# Patient Record
Sex: FEMALE | Race: WHITE | NOT HISPANIC OR LATINO | Employment: FULL TIME | URBAN - NONMETROPOLITAN AREA
[De-identification: names, ages, dates, MRNs, and addresses within clinical notes are randomized per-mention and may not be internally consistent; named-entity substitution may affect disease eponyms.]

---

## 2017-05-22 ENCOUNTER — TELEPHONE (OUTPATIENT)
Dept: OBSTETRICS AND GYNECOLOGY | Facility: CLINIC | Age: 47
End: 2017-05-22

## 2017-05-22 RX ORDER — NORGESTIMATE AND ETHINYL ESTRADIOL 0.25-0.035
1 KIT ORAL DAILY
Qty: 28 TABLET | Refills: 2 | Status: SHIPPED | OUTPATIENT
Start: 2017-05-22 | End: 2017-06-30 | Stop reason: SDUPTHER

## 2017-06-30 ENCOUNTER — OFFICE VISIT (OUTPATIENT)
Dept: OBSTETRICS AND GYNECOLOGY | Facility: CLINIC | Age: 47
End: 2017-06-30

## 2017-06-30 VITALS
BODY MASS INDEX: 18.95 KG/M2 | HEIGHT: 64 IN | DIASTOLIC BLOOD PRESSURE: 56 MMHG | SYSTOLIC BLOOD PRESSURE: 102 MMHG | WEIGHT: 111 LBS

## 2017-06-30 DIAGNOSIS — Z01.419 ENCOUNTER FOR GYNECOLOGICAL EXAMINATION WITHOUT ABNORMAL FINDING: Primary | ICD-10-CM

## 2017-06-30 PROCEDURE — 99396 PREV VISIT EST AGE 40-64: CPT | Performed by: OBSTETRICS & GYNECOLOGY

## 2017-06-30 RX ORDER — NORGESTIMATE AND ETHINYL ESTRADIOL 0.25-0.035
1 KIT ORAL DAILY
Qty: 28 TABLET | Refills: 12 | Status: SHIPPED | OUTPATIENT
Start: 2017-06-30 | End: 2017-07-03

## 2017-06-30 NOTE — PROGRESS NOTES
Subjective   Chief Complaint   Patient presents with   • Gynecologic Exam     Ras Echevarria is a 46 y.o. year old  (C/S x 2)  presenting to be seen for her annual exam.   Msnes regular for [employer]  Needs MMG  LABS WNL 1 year ago  SEXUAL Hx:  She is currently sexually active.  In the past year there has not been new sexual partners.    Condoms are not typically used.  She would not like to be screened for STD's at today's exam.  Current birth control method: OCP (estrogen/progesterone).  MENSTRUAL Hx:  Patient's last menstrual period was 2017.  In the past 6 months her cycles have been regular, predictable and occur monthly.   Her menstrual flow is normal.   Each month on average there are roughly 5 days of very heavy flow.    Intermenstrual bleeding is absent.    Post-coital bleeding is absent.  Dysmenorrhea: is not affecting her activities of daily living  PMS: is not affecting her activities of daily living  Her cycles are not a source of concern for her that she wishes to discuss today.  HEALTH Hx:  She exercises regularly: no (and has no plans to become more active).  She wears her seat belt:yes.  She has concerns about domestic violence: no.  OTHER COMPLAINTS:  Nothing else    The following portions of the patient's history were reviewed and updated as appropriate:problem list, current medications, allergies, past family history, past medical history, past social history and past surgical history.    Smoking status: Never Smoker                                                              Smokeless status: Never Used                        Review of Systems  Consitutional NEG: anorexia or night sweats    POS: nothing reported   Gastointestinal NEG: bloating, change in bowel habits, melena or reflux symptoms    POS: nothing reported   Genitourinary NEG: dysuria or hematuria    POS: nothing reported   Integument NEG: moles that are changing in size, shape, color or rashes    POS: nothing  "reported   Breast NEG: persistent breast lump, skin dimpling or nipple discharge    POS: nothing reported          Objective   /56  Ht 64\" (162.6 cm)  Wt 111 lb (50.3 kg)  LMP 05/02/2017  Breastfeeding? No  BMI 19.05 kg/m2    General:  well developed; well nourished  no acute distress   Skin:  No suspicious lesions seen   Thyroid: normal to inspection and palpation   Breasts:  Examined in supine position  Symmetric without masses or skin dimpling  Nipples normal without inversion, lesions or discharge  There are no palpable axillary nodes   Abdomen: soft, non-tender; no masses  no umbilical or inginual hernias are present  no hepato-splenomegaly   Pelvis: Clinical staff was present for exam  External genitalia:  normal appearance of the external genitalia including Bartholin's and [place]'s glands.  :  urethral meatus normal; urethral hypermobility is absent.  Vaginal:  normal pink mucosa without prolapse or lesions.  Cervix:  normal appearance.  Uterus:  normal size, shape and consistency.  Adnexa:  normal bimanual exam of the adnexa.        Assessment   1. Normal PE  2. Amernoereha since coming off of OCP 5/17     Plan   1. PAP done  2. Meds were prescribed - yes  3. Follow up prn, labs today    No orders of the defined types were placed in this encounter.         This note was electronically signed.      June 30, 2017    "

## 2017-07-01 LAB
ESTRADIOL SERPL-MCNC: 111.5 PG/ML
FSH SERPL-ACNC: 70.8 MIU/ML
HBA1C MFR BLD: 5.3 %
HCG INTACT+B SERPL-ACNC: <2.39 MIU/ML
PROLACTIN SERPL-MCNC: 35.4 NG/ML (ref 4.8–23.3)
TSH SERPL DL<=0.005 MIU/L-ACNC: 1.79 MIU/ML (ref 0.47–4.68)

## 2017-07-03 RX ORDER — NORGESTIMATE AND ETHINYL ESTRADIOL 0.25-0.035
1 KIT ORAL DAILY
Qty: 84 TABLET | Refills: 4 | Status: SHIPPED | OUTPATIENT
Start: 2017-07-03 | End: 2018-07-25 | Stop reason: SDUPTHER

## 2017-07-12 DIAGNOSIS — Z01.419 ENCOUNTER FOR GYNECOLOGICAL EXAMINATION WITHOUT ABNORMAL FINDING: ICD-10-CM

## 2017-07-26 ENCOUNTER — TELEPHONE (OUTPATIENT)
Dept: OBSTETRICS AND GYNECOLOGY | Facility: CLINIC | Age: 47
End: 2017-07-26

## 2017-07-26 DIAGNOSIS — Z12.31 SCREENING MAMMOGRAM, ENCOUNTER FOR: Primary | ICD-10-CM

## 2017-07-26 NOTE — TELEPHONE ENCOUNTER
----- Message from Radhika Galvan sent at 7/26/2017  8:47 AM EDT -----  Contact: PT  THIS IS DR BOJORQUEZ'S PT.  SHE CALLED WANTING TO CHANGE HER MAMMOGRAM ORDER TO [facility].  IT WAS PUT IN AS EXTERNAL.  THANKS

## 2017-08-11 ENCOUNTER — HOSPITAL ENCOUNTER (OUTPATIENT)
Dept: MAMMOGRAPHY | Facility: HOSPITAL | Age: 47
Discharge: HOME | End: 2017-08-11
Attending: OBSTETRICS & GYNECOLOGY | Admitting: OBSTETRICS & GYNECOLOGY

## 2017-08-11 ENCOUNTER — TELEPHONE (OUTPATIENT)
Dept: OBSTETRICS AND GYNECOLOGY | Facility: CLINIC | Age: 47
End: 2017-08-11

## 2017-08-11 DIAGNOSIS — Z12.31 SCREENING MAMMOGRAM, ENCOUNTER FOR: ICD-10-CM

## 2017-08-11 DIAGNOSIS — Z00.00 ROUTINE CHECK-UP: Primary | ICD-10-CM

## 2017-08-11 PROCEDURE — 77063 BREAST TOMOSYNTHESIS BI: CPT

## 2017-08-11 PROCEDURE — G0202 SCR MAMMO BI INCL CAD: HCPCS

## 2017-08-11 NOTE — TELEPHONE ENCOUNTER
----- Message from Rylee Sung sent at 8/11/2017  2:04 PM EDT -----  Contact: PT  PT REQUESTING HER SEROTONIN LEVELS TO BE DRAWN AT [facility]  [phone]

## 2017-08-12 LAB
ESTRADIOL SERPL-MCNC: 73 PG/ML
FSH SERPL-ACNC: 4.3 MIU/ML
PROLACTIN SERPL-MCNC: 24.4 NG/ML (ref 4.8–23.3)

## 2017-08-14 DIAGNOSIS — R92.8 ABNORMAL MAMMOGRAM: Primary | ICD-10-CM

## 2017-09-01 ENCOUNTER — HOSPITAL ENCOUNTER (OUTPATIENT)
Dept: MAMMOGRAPHY | Facility: HOSPITAL | Age: 47
Discharge: HOME | End: 2017-09-01
Attending: OBSTETRICS & GYNECOLOGY | Admitting: OBSTETRICS & GYNECOLOGY

## 2017-09-01 DIAGNOSIS — R92.8 ABNORMAL MAMMOGRAM: ICD-10-CM

## 2017-09-01 PROCEDURE — G0206 DX MAMMO INCL CAD UNI: HCPCS

## 2017-09-01 PROCEDURE — G0279 TOMOSYNTHESIS, MAMMO: HCPCS

## 2018-01-26 ENCOUNTER — TRANSCRIBE ORDERS (OUTPATIENT)
Dept: MAMMOGRAPHY | Facility: HOSPITAL | Age: 48
End: 2018-01-26

## 2018-01-26 DIAGNOSIS — R92.8 ABNORMAL MAMMOGRAM: Primary | ICD-10-CM

## 2018-03-01 ENCOUNTER — HOSPITAL ENCOUNTER (OUTPATIENT)
Dept: MAMMOGRAPHY | Facility: HOSPITAL | Age: 48
Discharge: HOME | End: 2018-03-01
Attending: OBSTETRICS & GYNECOLOGY | Admitting: OBSTETRICS & GYNECOLOGY

## 2018-03-01 DIAGNOSIS — R92.8 ABNORMAL MAMMOGRAM: ICD-10-CM

## 2018-03-01 PROCEDURE — G0279 TOMOSYNTHESIS, MAMMO: HCPCS

## 2018-03-01 PROCEDURE — 77065 DX MAMMO INCL CAD UNI: CPT

## 2018-04-17 ENCOUNTER — TRANSCRIBE ORDERS (OUTPATIENT)
Dept: MAMMOGRAPHY | Facility: HOSPITAL | Age: 48
End: 2018-04-17

## 2018-04-17 DIAGNOSIS — Z12.39 BREAST CANCER SCREENING: Primary | ICD-10-CM

## 2018-08-13 ENCOUNTER — HOSPITAL ENCOUNTER (OUTPATIENT)
Dept: MAMMOGRAPHY | Facility: HOSPITAL | Age: 48
Discharge: HOME | End: 2018-08-13
Attending: OBSTETRICS & GYNECOLOGY | Admitting: OBSTETRICS & GYNECOLOGY

## 2018-08-13 DIAGNOSIS — Z12.39 BREAST CANCER SCREENING: ICD-10-CM

## 2018-08-13 PROCEDURE — 77067 SCR MAMMO BI INCL CAD: CPT

## 2018-08-13 PROCEDURE — 77063 BREAST TOMOSYNTHESIS BI: CPT

## 2018-08-13 PROCEDURE — 77066 DX MAMMO INCL CAD BI: CPT

## 2018-08-13 PROCEDURE — G0279 TOMOSYNTHESIS, MAMMO: HCPCS

## 2018-09-14 ENCOUNTER — OFFICE VISIT (OUTPATIENT)
Dept: OBSTETRICS AND GYNECOLOGY | Facility: CLINIC | Age: 48
End: 2018-09-14

## 2018-09-14 VITALS
WEIGHT: 111 LBS | DIASTOLIC BLOOD PRESSURE: 62 MMHG | SYSTOLIC BLOOD PRESSURE: 110 MMHG | HEIGHT: 64 IN | BODY MASS INDEX: 18.95 KG/M2

## 2018-09-14 DIAGNOSIS — Z01.419 ENCOUNTER FOR GYNECOLOGICAL EXAMINATION WITHOUT ABNORMAL FINDING: Primary | ICD-10-CM

## 2018-09-14 PROCEDURE — 99396 PREV VISIT EST AGE 40-64: CPT | Performed by: OBSTETRICS & GYNECOLOGY

## 2018-09-14 NOTE — PROGRESS NOTES
Subjective   Chief Complaint   Patient presents with   • Gynecologic Exam     Last pap 2017 WNL, Hx of abnormal paps, (LEEP)     Ras Echevarria is a 48 y.o. year old  presenting to be seen for her annual exam.     SEXUAL Hx:  She is currently sexually active.  In the past year there has not been new sexual partners.    Condoms are not typically used.  She would not like to be screened for STD's at today's exam.  Current birth control method: OCP (estrogen/progesterone).  MENSTRUAL Hx:  Patient's last menstrual period was 2017.  In the past 6 months her cycles have been regular, predictable and occur monthly.   Her menstrual flow is normal.   Each month on average there are roughly 0 days of very heavy flow.    Intermenstrual bleeding is absent.    Post-coital bleeding is absent.  Dysmenorrhea: is not affecting her activities of daily living  PMS: is not affecting her activities of daily living  Her cycles are not a source of concern for her that she wishes to discuss today.  HEALTH Hx:  She exercises regularly: no (and has no plans to become more active).  She wears her seat belt:yes.  She has concerns about domestic violence: no.  OTHER COMPLAINTS:  Nothing else    The following portions of the patient's history were reviewed and updated as appropriate:  She  has a past medical history of Abnormal Pap smear of cervix; Depression; H/O colposcopy with cervical biopsy (2014); History of colposcopy (2015); HPV in female (2013); HPV in female (2014); HPV in female (2015); HPV in female (2015); and Moderate dysplasia of cervix (ADIN II) ().  She  does not have any pertinent problems on file.  She  has a past surgical history that includes LEEP (2014);  section (10/1996); and  section (2003).  Her family history includes Diabetes in her mother; Hypertension in her brother and mother; Thyroid disease in her mother.  She  reports that she  "has never smoked. She has never used smokeless tobacco. She reports that she does not drink alcohol or use drugs.  Current Outpatient Prescriptions   Medication Sig Dispense Refill   • SPRINTEC 28 0.25-35 MG-MCG per tablet TAKE 1 TABLET BY MOUTH DAILY. 84 tablet 0     No current facility-administered medications for this visit.      Current Outpatient Prescriptions on File Prior to Visit   Medication Sig   • SPRINTEC 28 0.25-35 MG-MCG per tablet TAKE 1 TABLET BY MOUTH DAILY.     No current facility-administered medications on file prior to visit.      She has No Known Allergies..    Smoking status: Never Smoker                                                              Smokeless tobacco: Never Used                        Review of Systems  Consitutional NEG: anorexia or night sweats    POS: nothing reported   Gastointestinal NEG: bloating, change in bowel habits, melena or reflux symptoms    POS: nothing reported   Genitourinary NEG: dysuria or hematuria    POS: nothing reported   Integument NEG: moles that are changing in size, shape, color or rashes    POS: nothing reported   Breast NEG: persistent breast lump, skin dimpling or nipple discharge    POS: nothing reported          Objective   /62   Ht 162.6 cm (64\")   Wt 50.3 kg (111 lb)   LMP 06/01/2017   BMI 19.05 kg/m²     General:  well developed; well nourished  no acute distress   Skin:  No suspicious lesions seen   Thyroid: normal to inspection and palpation   Breasts:  Examined in supine position  Symmetric without masses or skin dimpling  Nipples normal without inversion, lesions or discharge  There are no palpable axillary nodes   Abdomen: soft, non-tender; no masses  no umbilical or inginual hernias are present  no hepato-splenomegaly   Pelvis: Clinical staff was present for exam  External genitalia:  normal appearance of the external genitalia including Bartholin's and [place]'s glands.  :  urethral meatus normal;  Vaginal:  normal pink mucosa " without prolapse or lesions.  Cervix:  normal appearance.  Uterus:  normal size, shape and consistency.  Adnexa:  normal bimanual exam of the adnexa.  Rectal:  digital rectal exam not performed; anus visually normal appearing.        Assessment   1. NOrmal PE     Plan   1. PAP done  2. Sprintec refilled  3. MMG reviewed  4. Fasting labs  5. Follow up     No orders of the defined types were placed in this encounter.         This note was electronically signed.      September 14, 2018

## 2018-09-19 ENCOUNTER — RESULTS ENCOUNTER (OUTPATIENT)
Dept: OBSTETRICS AND GYNECOLOGY | Facility: CLINIC | Age: 48
End: 2018-09-19

## 2018-09-19 DIAGNOSIS — Z01.419 ENCOUNTER FOR GYNECOLOGICAL EXAMINATION WITHOUT ABNORMAL FINDING: ICD-10-CM

## 2018-09-21 DIAGNOSIS — Z01.419 ENCOUNTER FOR GYNECOLOGICAL EXAMINATION WITHOUT ABNORMAL FINDING: ICD-10-CM

## 2018-09-21 LAB
ALBUMIN SERPL-MCNC: 4.1 G/DL (ref 3.5–5)
ALBUMIN/GLOB SERPL: 1.6 G/DL (ref 1–2)
ALP SERPL-CCNC: 46 U/L (ref 38–126)
ALT SERPL-CCNC: 30 U/L (ref 13–69)
AST SERPL-CCNC: 25 U/L (ref 15–46)
BASOPHILS # BLD AUTO: 0.06 10*3/MM3 (ref 0–0.2)
BASOPHILS NFR BLD AUTO: 1.1 % (ref 0–2.5)
BILIRUB SERPL-MCNC: 0.4 MG/DL (ref 0.2–1.3)
BUN SERPL-MCNC: 19 MG/DL (ref 7–20)
BUN/CREAT SERPL: 21.1 (ref 7.1–23.5)
CALCIUM SERPL-MCNC: 9.1 MG/DL (ref 8.4–10.2)
CHLORIDE SERPL-SCNC: 106 MMOL/L (ref 98–107)
CHOLEST SERPL-MCNC: 137 MG/DL (ref 0–199)
CO2 SERPL-SCNC: 25 MMOL/L (ref 26–30)
CREAT SERPL-MCNC: 0.9 MG/DL (ref 0.6–1.3)
EOSINOPHIL # BLD AUTO: 0.15 10*3/MM3 (ref 0–0.7)
EOSINOPHIL NFR BLD AUTO: 2.7 % (ref 0–7)
ERYTHROCYTE [DISTWIDTH] IN BLOOD BY AUTOMATED COUNT: 12.8 % (ref 11.5–14.5)
GLOBULIN SER CALC-MCNC: 2.6 GM/DL
GLUCOSE SERPL-MCNC: 87 MG/DL (ref 74–98)
HBA1C MFR BLD: 5.3 %
HCT VFR BLD AUTO: 37.8 % (ref 37–47)
HDLC SERPL-MCNC: 73 MG/DL (ref 40–60)
HGB BLD-MCNC: 12.7 G/DL (ref 12–16)
IMM GRANULOCYTES # BLD: 0.02 10*3/MM3 (ref 0–0.06)
IMM GRANULOCYTES NFR BLD: 0.4 % (ref 0–0.6)
LDLC SERPL CALC-MCNC: 53 MG/DL (ref 0–99)
LYMPHOCYTES # BLD AUTO: 1.44 10*3/MM3 (ref 0.6–3.4)
LYMPHOCYTES NFR BLD AUTO: 26.1 % (ref 10–50)
MCH RBC QN AUTO: 32.2 PG (ref 27–31)
MCHC RBC AUTO-ENTMCNC: 33.6 G/DL (ref 30–37)
MCV RBC AUTO: 95.9 FL (ref 81–99)
MONOCYTES # BLD AUTO: 0.52 10*3/MM3 (ref 0–0.9)
MONOCYTES NFR BLD AUTO: 9.4 % (ref 0–12)
NEUTROPHILS # BLD AUTO: 3.32 10*3/MM3 (ref 2–6.9)
NEUTROPHILS NFR BLD AUTO: 60.3 % (ref 37–80)
NRBC BLD AUTO-RTO: 0 /100 WBC (ref 0–0)
PLATELET # BLD AUTO: 293 10*3/MM3 (ref 130–400)
POTASSIUM SERPL-SCNC: 4.3 MMOL/L (ref 3.5–5.1)
PROT SERPL-MCNC: 6.7 G/DL (ref 6.3–8.2)
RBC # BLD AUTO: 3.94 10*6/MM3 (ref 4.2–5.4)
SODIUM SERPL-SCNC: 139 MMOL/L (ref 137–145)
TRIGL SERPL-MCNC: 56 MG/DL
TSH SERPL DL<=0.005 MIU/L-ACNC: 1.62 MIU/ML (ref 0.47–4.68)
VLDLC SERPL CALC-MCNC: 11.2 MG/DL
WBC # BLD AUTO: 5.51 10*3/MM3 (ref 4.8–10.8)

## 2018-09-26 ENCOUNTER — TELEPHONE (OUTPATIENT)
Dept: OBSTETRICS AND GYNECOLOGY | Facility: CLINIC | Age: 48
End: 2018-09-26

## 2018-09-26 RX ORDER — METRONIDAZOLE 500 MG/1
500 TABLET ORAL 2 TIMES DAILY
Qty: 14 TABLET | Refills: 0 | Status: SHIPPED | OUTPATIENT
Start: 2018-09-26 | End: 2018-10-03

## 2018-09-26 NOTE — TELEPHONE ENCOUNTER
----- Message from Mikael Arango MD sent at 9/23/2018 10:05 AM EDT -----  Flagyl 500mg po BID x 7 days

## 2018-10-19 ENCOUNTER — TELEPHONE (OUTPATIENT)
Dept: OBSTETRICS AND GYNECOLOGY | Facility: CLINIC | Age: 48
End: 2018-10-19

## 2018-10-19 RX ORDER — NORGESTIMATE AND ETHINYL ESTRADIOL 0.25-0.035
1 KIT ORAL DAILY
Qty: 84 TABLET | Refills: 3 | Status: SHIPPED | OUTPATIENT
Start: 2018-10-19 | End: 2020-01-21 | Stop reason: SDUPTHER

## 2018-10-19 RX ORDER — NORGESTIMATE AND ETHINYL ESTRADIOL 0.25-0.035
1 KIT ORAL DAILY
Qty: 84 TABLET | Refills: 3 | Status: SHIPPED | OUTPATIENT
Start: 2018-10-19 | End: 2018-10-19 | Stop reason: SDUPTHER

## 2018-10-19 NOTE — TELEPHONE ENCOUNTER
----- Message from Radhika Galvan sent at 10/19/2018  3:15 PM EDT -----  Contact: PT  THIS IS DR BOJROQUEZ'S PT.  SHE WAS SEEN FOR ANNUAL ON 9/14/18, BUT DIDN'T GET REFILLS ON HER SPRINTEC.  PLEASE SEND TO [facility] IN [place].  THANKS

## 2018-10-19 NOTE — TELEPHONE ENCOUNTER
----- Message from Radhika Galvan sent at 10/19/2018  3:15 PM EDT -----  Contact: PT  THIS IS DR BOJORQUEZ'S PT.  SHE WAS SEEN FOR ANNUAL ON 9/14/18, BUT DIDN'T GET REFILLS ON HER SPRINTEC.  PLEASE SEND TO [facility] IN [place].  THANKS

## 2018-11-02 ENCOUNTER — APPOINTMENT (OUTPATIENT)
Dept: GENERAL RADIOLOGY | Facility: HOSPITAL | Age: 48
End: 2018-11-02

## 2018-11-02 ENCOUNTER — HOSPITAL ENCOUNTER (EMERGENCY)
Facility: HOSPITAL | Age: 48
Discharge: HOME | End: 2018-11-02
Attending: STUDENT IN AN ORGANIZED HEALTH CARE EDUCATION/TRAINING PROGRAM | Admitting: STUDENT IN AN ORGANIZED HEALTH CARE EDUCATION/TRAINING PROGRAM

## 2018-11-02 VITALS
RESPIRATION RATE: 18 BRPM | WEIGHT: 109 LBS | TEMPERATURE: 98.2 F | HEART RATE: 70 BPM | DIASTOLIC BLOOD PRESSURE: 78 MMHG | SYSTOLIC BLOOD PRESSURE: 106 MMHG | BODY MASS INDEX: 19.31 KG/M2 | HEIGHT: 63 IN | OXYGEN SATURATION: 98 %

## 2018-11-02 DIAGNOSIS — R07.9 CHEST PAIN, UNSPECIFIED TYPE: Primary | ICD-10-CM

## 2018-11-02 LAB
ALBUMIN SERPL-MCNC: 4 G/DL (ref 3.5–5)
ALBUMIN/GLOB SERPL: 1.3 G/DL (ref 1–2)
ALP SERPL-CCNC: 47 U/L (ref 38–126)
ALT SERPL W P-5'-P-CCNC: 36 U/L (ref 13–69)
ANION GAP SERPL CALCULATED.3IONS-SCNC: 10.7 MMOL/L (ref 10–20)
AST SERPL-CCNC: 34 U/L (ref 15–46)
BASOPHILS # BLD AUTO: 0.04 10*3/MM3 (ref 0–0.2)
BASOPHILS NFR BLD AUTO: 0.6 % (ref 0–2.5)
BILIRUB SERPL-MCNC: 0.4 MG/DL (ref 0.2–1.3)
BUN BLD-MCNC: 19 MG/DL (ref 7–20)
BUN/CREAT SERPL: 27.1 (ref 7.1–23.5)
CALCIUM SPEC-SCNC: 8.4 MG/DL (ref 8.4–10.2)
CHLORIDE SERPL-SCNC: 106 MMOL/L (ref 98–107)
CO2 SERPL-SCNC: 25 MMOL/L (ref 26–30)
CREAT BLD-MCNC: 0.7 MG/DL (ref 0.6–1.3)
DEPRECATED RDW RBC AUTO: 44.4 FL (ref 37–54)
EOSINOPHIL # BLD AUTO: 0.12 10*3/MM3 (ref 0–0.7)
EOSINOPHIL NFR BLD AUTO: 1.7 % (ref 0–7)
ERYTHROCYTE [DISTWIDTH] IN BLOOD BY AUTOMATED COUNT: 12.8 % (ref 11.5–14.5)
GFR SERPL CREATININE-BSD FRML MDRD: 90 ML/MIN/1.73
GLOBULIN UR ELPH-MCNC: 3.2 GM/DL
GLUCOSE BLD-MCNC: 98 MG/DL (ref 74–98)
HCT VFR BLD AUTO: 35.3 % (ref 37–47)
HGB BLD-MCNC: 11.8 G/DL (ref 12–16)
HOLD SPECIMEN: NORMAL
HOLD SPECIMEN: NORMAL
IMM GRANULOCYTES # BLD: 0.02 10*3/MM3 (ref 0–0.06)
IMM GRANULOCYTES NFR BLD: 0.3 % (ref 0–0.6)
LYMPHOCYTES # BLD AUTO: 1.94 10*3/MM3 (ref 0.6–3.4)
LYMPHOCYTES NFR BLD AUTO: 27.4 % (ref 10–50)
MCH RBC QN AUTO: 31.7 PG (ref 27–31)
MCHC RBC AUTO-ENTMCNC: 33.4 G/DL (ref 30–37)
MCV RBC AUTO: 94.9 FL (ref 81–99)
MONOCYTES # BLD AUTO: 0.79 10*3/MM3 (ref 0–0.9)
MONOCYTES NFR BLD AUTO: 11.1 % (ref 0–12)
NEUTROPHILS # BLD AUTO: 4.18 10*3/MM3 (ref 2–6.9)
NEUTROPHILS NFR BLD AUTO: 58.9 % (ref 37–80)
NRBC BLD MANUAL-RTO: 0 /100 WBC (ref 0–0)
PLATELET # BLD AUTO: 225 10*3/MM3 (ref 130–400)
PMV BLD AUTO: 10 FL (ref 6–12)
POTASSIUM BLD-SCNC: 4.7 MMOL/L (ref 3.5–5.1)
PROT SERPL-MCNC: 7.2 G/DL (ref 6.3–8.2)
RBC # BLD AUTO: 3.72 10*6/MM3 (ref 4.2–5.4)
RBC MORPH BLD: NORMAL
SMALL PLATELETS BLD QL SMEAR: ADEQUATE
SODIUM BLD-SCNC: 137 MMOL/L (ref 137–145)
TROPONIN I SERPL-MCNC: 0.02 NG/ML (ref 0–0.03)
TROPONIN I SERPL-MCNC: <0.012 NG/ML (ref 0–0.03)
WBC MORPH BLD: NORMAL
WBC NRBC COR # BLD: 7.09 10*3/MM3 (ref 4.8–10.8)
WHOLE BLOOD HOLD SPECIMEN: NORMAL
WHOLE BLOOD HOLD SPECIMEN: NORMAL

## 2018-11-02 PROCEDURE — 80053 COMPREHEN METABOLIC PANEL: CPT | Performed by: STUDENT IN AN ORGANIZED HEALTH CARE EDUCATION/TRAINING PROGRAM

## 2018-11-02 PROCEDURE — 84484 ASSAY OF TROPONIN QUANT: CPT | Performed by: STUDENT IN AN ORGANIZED HEALTH CARE EDUCATION/TRAINING PROGRAM

## 2018-11-02 PROCEDURE — 93005 ELECTROCARDIOGRAM TRACING: CPT | Performed by: STUDENT IN AN ORGANIZED HEALTH CARE EDUCATION/TRAINING PROGRAM

## 2018-11-02 PROCEDURE — 85025 COMPLETE CBC W/AUTO DIFF WBC: CPT | Performed by: STUDENT IN AN ORGANIZED HEALTH CARE EDUCATION/TRAINING PROGRAM

## 2018-11-02 PROCEDURE — 85007 BL SMEAR W/DIFF WBC COUNT: CPT | Performed by: STUDENT IN AN ORGANIZED HEALTH CARE EDUCATION/TRAINING PROGRAM

## 2018-11-02 PROCEDURE — 71046 X-RAY EXAM CHEST 2 VIEWS: CPT

## 2018-11-02 PROCEDURE — 99284 EMERGENCY DEPT VISIT MOD MDM: CPT

## 2018-11-02 NOTE — ED PROVIDER NOTES
Subjective   48-year-old female that presents after having an episode of chest pain while at work this morning.  Pain started approximately a quartertil 8 and lasted a little over one hour.  The patient states that she is concerned this may have been related to too much caffeine as she had a super strong coffee before going to work this morning.  Patient is otherwise healthy on medication she takes is birth control.  No prominent family history for premature coronary artery disease.  Patient does not smoke and does not use illicit drugs.            Review of Systems   All other systems reviewed and are negative.      Past Medical History:   Diagnosis Date   • Abnormal Pap smear of cervix    • Depression    • H/O colposcopy with cervical biopsy 2014    MILD TO MODERATE DYSPLASIA ( DR BYNUM)   • History of colposcopy 2015    NO BIOPSY TAKEN (DR BYNUM)   • HPV in female 2013    NON 16/18 POSITIVE ON PAP (PAP NORMAL)   • HPV in female 2014    POSITIVE NON 16/18 (PAP NORMAL)   • HPV in female 2015    POSITIVE NON 16/18 (PAP NORMAL)   • HPV in female 2015    POSITIVE NON 16/18 (PAP NORMAL)   • Moderate dysplasia of cervix (ADIN II)        No Known Allergies    Past Surgical History:   Procedure Laterality Date   •  SECTION  10/1996   •  SECTION  2003   • LEEP  2014    DR BYNUM       Family History   Problem Relation Age of Onset   • Thyroid disease Mother    • Hypertension Mother    • Diabetes Mother    • Hypertension Brother        Social History     Social History   • Marital status: Single     Social History Main Topics   • Smoking status: Never Smoker   • Smokeless tobacco: Never Used   • Alcohol use No   • Drug use: No   • Sexual activity: Not Currently     Other Topics Concern   • Not on file           Objective   Physical Exam   Nursing note and vitals reviewed.    GEN: No acute distress  Head: Normocephalic, atraumatic  Eyes: Pupils equal  round reactive to light  ENT: Posterior pharynx normal in appearance, oral mucosa is moist  Chest: Nontender to palpation  Cardiovascular: Regular rate  Lungs: Clear to auscultation bilaterally  Abdomen: Soft, nontender, nondistended, no peritoneal signs  Extremities: No edema, normal appearance  Neuro: GCS 15  Psych: Mood and affect are appropriate    Procedures           ED Course  ED Course as of Nov 02 1417 Fri Nov 02, 2018   1143 EKG shows normal sinus rhythm rate of 74.  There is RSR' pattern in V1 and V2 consistent with a right ventricular conduction delay.  QRS interval is 100.  No significant ST segments.  Borderline EKG.  Interpreted by me.  [DT]      ED Course User Index  [DT] Hans Goldman MD                  MDM  Number of Diagnoses or Management Options  Chest pain, unspecified type:   Diagnosis management comments: Differential diagnosis for this patient would include acute coronary syndrome, pulmonary embolus, thoracic aortic dissection, pericarditis, pneumonia, pneumothorax, Boerhaave syndrome, or other concerns.  Patient's chest x-ray shows no evidence of infiltrate or pneumothorax with a normal mediastinum.  I doubt thoracic aortic dissection as the pain was not maximal in onset, ripping or tearing, did not migrate, along with a normal mediastinum on chest x-ray.  At this time the pain would be atypical for pulmonary embolus as it does not have a pleuritic component and is intermittent.  Serial troponins will be drawn to assess for acute MI.       Amount and/or Complexity of Data Reviewed  Clinical lab tests: reviewed  Tests in the radiology section of CPT®: reviewed  Decide to obtain previous medical records or to obtain history from someone other than the patient: yes  Obtain history from someone other than the patient: yes  Independent visualization of images, tracings, or specimens: yes          Final diagnoses:   Chest pain, unspecified type            Hans Golmdan MD  11/02/18  3121

## 2020-01-21 ENCOUNTER — OFFICE VISIT (OUTPATIENT)
Dept: OBSTETRICS AND GYNECOLOGY | Facility: CLINIC | Age: 50
End: 2020-01-21

## 2020-01-21 VITALS
SYSTOLIC BLOOD PRESSURE: 108 MMHG | DIASTOLIC BLOOD PRESSURE: 60 MMHG | WEIGHT: 113 LBS | HEIGHT: 63 IN | BODY MASS INDEX: 20.02 KG/M2

## 2020-01-21 DIAGNOSIS — Z01.419 ENCOUNTER FOR GYNECOLOGICAL EXAMINATION WITHOUT ABNORMAL FINDING: Primary | ICD-10-CM

## 2020-01-21 PROCEDURE — 99396 PREV VISIT EST AGE 40-64: CPT | Performed by: OBSTETRICS & GYNECOLOGY

## 2020-01-21 RX ORDER — NORGESTIMATE AND ETHINYL ESTRADIOL 0.25-0.035
1 KIT ORAL DAILY
Qty: 84 TABLET | Refills: 3 | Status: SHIPPED | OUTPATIENT
Start: 2020-01-21

## 2020-01-21 NOTE — PROGRESS NOTES
Subjective   Chief Complaint   Patient presents with   • Gynecologic Exam     Last pap 2018 WNL, hx of abnormal paps, Last Mammogram 2018, Needs order for one    • Med Refill     needs yearly refill      Ras Echevarria is a 49 y.o. year old  presenting to be seen for her annual exam.     SEXUAL Hx:  She is currently sexually active.  In the past year there has not been new sexual partners.    Condoms are not typically used.  She would not like to be screened for STD's at today's exam.  Current birth control method: OCP (estrogen/progesterone).  MENSTRUAL Hx:  Patient's last menstrual period was 2020.  In the past 6 months her cycles have been regular, predictable and occur monthly.   Her menstrual flow is normal.   Each month on average there are roughly 0 days of very heavy flow.    Intermenstrual bleeding is absent.    Post-coital bleeding is absent.  Dysmenorrhea: is not affecting her activities of daily living  PMS: is not affecting her activities of daily living  Her cycles are not a source of concern for her that she wishes to discuss today.  HEALTH Hx:  She exercises regularly: no (and has no plans to become more active).  She wears her seat belt:yes.  She has concerns about domestic violence: no.  OTHER COMPLAINTS:  Nothing else    The following portions of the patient's history were reviewed and updated as appropriate:  She  has a past medical history of Abnormal Pap smear of cervix, Depression, H/O colposcopy with cervical biopsy (2014), History of colposcopy (2015), HPV in female (2013), HPV in female (2014), HPV in female (2015), HPV in female (2015), and Moderate dysplasia of cervix (ADIN II) ().  She does not have any pertinent problems on file.  She  has a past surgical history that includes LEEP (2014);  section (10/1996); and  section (2003).  Her family history includes Diabetes in her mother; Hypertension in her  "brother and mother; Thyroid disease in her mother.  She  reports that she has never smoked. She has never used smokeless tobacco. She reports that she does not drink alcohol or use drugs.  Current Outpatient Medications   Medication Sig Dispense Refill   • norgestimate-ethinyl estradiol (SPRINTEC 28) 0.25-35 MG-MCG per tablet Take 1 tablet by mouth Daily. 84 tablet 3     No current facility-administered medications for this visit.      Current Outpatient Medications on File Prior to Visit   Medication Sig   • norgestimate-ethinyl estradiol (SPRINTEC 28) 0.25-35 MG-MCG per tablet Take 1 tablet by mouth Daily.     No current facility-administered medications on file prior to visit.      She has No Known Allergies..    Social History    Tobacco Use      Smoking status: Never Smoker      Smokeless tobacco: Never Used    Review of Systems  Consitutional NEG: anorexia or night sweats    POS: nothing reported   Gastointestinal NEG: bloating, change in bowel habits, melena or reflux symptoms    POS: nothing reported   Genitourinary NEG: dysuria or hematuria    POS: nothing reported   Integument NEG: moles that are changing in size, shape, color or rashes    POS: nothing reported   Breast NEG: persistent breast lump, skin dimpling or nipple discharge    POS: nothing reported          Objective   /60   Ht 160 cm (63\")   Wt 51.3 kg (113 lb)   LMP 01/07/2020   BMI 20.02 kg/m²     General:  well developed; well nourished  no acute distress   Skin:  No suspicious lesions seen   Thyroid: normal to inspection and palpation   Breasts:  Examined in supine position  Symmetric without masses or skin dimpling  Nipples normal without inversion, lesions or discharge  There are no palpable axillary nodes   Abdomen: soft, non-tender; no masses  no umbilical or inguinal hernias are present  no hepato-splenomegaly   Pelvis: Clinical staff was present for exam  External genitalia:  normal appearance of the external genitalia " including Bartholin's and [place]'s glands.  :  urethral meatus normal;  Vaginal:  normal pink mucosa without prolapse or lesions.  Cervix:  normal appearance.  Uterus:  normal size, shape and consistency.  Adnexa:  normal bimanual exam of the adnexa.  Rectal:  digital rectal exam not performed; anus visually normal appearing.        Assessment   1. Normal PE     Plan   1. PAP done  2. MMG  3. OCP refilled  4. Follow up     No orders of the defined types were placed in this encounter.         This note was electronically signed.      January 21, 2020

## 2020-01-24 DIAGNOSIS — Z01.419 ENCOUNTER FOR GYNECOLOGICAL EXAMINATION WITHOUT ABNORMAL FINDING: ICD-10-CM

## 2020-03-06 ENCOUNTER — HOSPITAL ENCOUNTER (OUTPATIENT)
Dept: MAMMOGRAPHY | Facility: HOSPITAL | Age: 50
Discharge: HOME | End: 2020-03-06
Admitting: OBSTETRICS & GYNECOLOGY

## 2020-03-06 DIAGNOSIS — Z01.419 ENCOUNTER FOR GYNECOLOGICAL EXAMINATION WITHOUT ABNORMAL FINDING: ICD-10-CM

## 2020-03-06 PROCEDURE — 77063 BREAST TOMOSYNTHESIS BI: CPT

## 2020-03-06 PROCEDURE — 77067 SCR MAMMO BI INCL CAD: CPT

## 2021-02-23 ENCOUNTER — TRANSCRIBE ORDERS (OUTPATIENT)
Dept: ADMINISTRATIVE | Facility: HOSPITAL | Age: 51
End: 2021-02-23

## 2021-02-23 DIAGNOSIS — Z12.31 VISIT FOR SCREENING MAMMOGRAM: Primary | ICD-10-CM

## 2021-04-13 ENCOUNTER — HOSPITAL ENCOUNTER (OUTPATIENT)
Dept: MAMMOGRAPHY | Facility: HOSPITAL | Age: 51
Discharge: HOME | End: 2021-04-13
Admitting: RADIOLOGY

## 2021-04-13 DIAGNOSIS — Z12.31 VISIT FOR SCREENING MAMMOGRAM: ICD-10-CM

## 2021-04-13 PROCEDURE — 77067 SCR MAMMO BI INCL CAD: CPT

## 2021-04-13 PROCEDURE — 77063 BREAST TOMOSYNTHESIS BI: CPT

## 2022-02-18 ENCOUNTER — APPOINTMENT (OUTPATIENT)
Dept: GENERAL RADIOLOGY | Facility: HOSPITAL | Age: 52
End: 2022-02-18

## 2022-02-18 ENCOUNTER — HOSPITAL ENCOUNTER (EMERGENCY)
Facility: HOSPITAL | Age: 52
Discharge: HOME | End: 2022-02-18
Attending: FAMILY MEDICINE | Admitting: FAMILY MEDICINE

## 2022-02-18 VITALS
HEART RATE: 96 BPM | TEMPERATURE: 98 F | WEIGHT: 115 LBS | OXYGEN SATURATION: 97 % | BODY MASS INDEX: 20.38 KG/M2 | HEIGHT: 63 IN | SYSTOLIC BLOOD PRESSURE: 135 MMHG | RESPIRATION RATE: 18 BRPM | DIASTOLIC BLOOD PRESSURE: 86 MMHG

## 2022-02-18 DIAGNOSIS — S81.812A: Primary | ICD-10-CM

## 2022-02-18 PROCEDURE — 90715 TDAP VACCINE 7 YRS/> IM: CPT | Performed by: FAMILY MEDICINE

## 2022-02-18 PROCEDURE — 25010000002 TETANUS-DIPHTH-ACELL PERTUSSIS 5-2.5-18.5 LF-MCG/0.5 SUSPENSION PREFILLED SYRINGE: Performed by: FAMILY MEDICINE

## 2022-02-18 PROCEDURE — 99282 EMERGENCY DEPT VISIT SF MDM: CPT

## 2022-02-18 PROCEDURE — 90471 IMMUNIZATION ADMIN: CPT | Performed by: FAMILY MEDICINE

## 2022-02-18 PROCEDURE — 73590 X-RAY EXAM OF LOWER LEG: CPT

## 2022-02-18 PROCEDURE — 0 LIDOCAINE 1 % SOLUTION: Performed by: FAMILY MEDICINE

## 2022-02-18 RX ORDER — IBUPROFEN 800 MG/1
800 TABLET ORAL EVERY 6 HOURS PRN
Qty: 20 TABLET | Refills: 0 | Status: SHIPPED | OUTPATIENT
Start: 2022-02-18

## 2022-02-18 RX ORDER — BUPROPION HYDROCHLORIDE 300 MG/1
300 TABLET ORAL DAILY
COMMUNITY

## 2022-02-18 RX ORDER — LIDOCAINE HYDROCHLORIDE 10 MG/ML
10 INJECTION, SOLUTION INFILTRATION; PERINEURAL ONCE
Status: COMPLETED | OUTPATIENT
Start: 2022-02-18 | End: 2022-02-18

## 2022-02-18 RX ORDER — CEPHALEXIN 500 MG/1
500 CAPSULE ORAL 3 TIMES DAILY
Qty: 21 CAPSULE | Refills: 0 | Status: SHIPPED | OUTPATIENT
Start: 2022-02-18 | End: 2022-02-25

## 2022-02-18 RX ADMIN — TETANUS TOXOID, REDUCED DIPHTHERIA TOXOID AND ACELLULAR PERTUSSIS VACCINE, ADSORBED 0.5 ML: 5; 2.5; 8; 8; 2.5 SUSPENSION INTRAMUSCULAR at 22:30

## 2022-02-18 RX ADMIN — LIDOCAINE HYDROCHLORIDE 10 ML: 10 INJECTION, SOLUTION INFILTRATION; PERINEURAL at 23:31

## 2022-02-19 NOTE — ED PROVIDER NOTES
Subjective   50 yo female presentstothe ED wit ha leg injury. Pt was getting out of her glass shower when she bumped the glass door causing it to shatter. The glass hit her left leg and she has a lower leg injury.      History provided by:  Patient  Leg Injury  Location:  Lower leg  Severity:  Moderate  Onset quality:  Sudden  Timing:  Constant  Progression:  Unchanged  Chronicity:  New  Context:  See hpi  Relieved by:  Direct pressure  Worsened by:  Nothing  Ineffective treatments:  None  Associated symptoms: no abdominal pain, no chest pain and no fever        Review of Systems   Constitutional: Negative.  Negative for fever.   HENT: Negative.    Respiratory: Negative.    Cardiovascular: Negative.  Negative for chest pain.   Gastrointestinal: Negative.  Negative for abdominal pain.   Endocrine: Negative.    Genitourinary: Negative.  Negative for dysuria.   Skin: Negative.    Neurological: Negative for light-headedness.   Psychiatric/Behavioral: Negative.    All other systems reviewed and are negative.      Past Medical History:   Diagnosis Date   • Abnormal Pap smear of cervix    • Depression    • H/O colposcopy with cervical biopsy 2014    MILD TO MODERATE DYSPLASIA ( DR BYNUM)   • History of colposcopy 2015    NO BIOPSY TAKEN (DR BYNUM)   • HPV in female 2013    NON 16/18 POSITIVE ON PAP (PAP NORMAL)   • HPV in female 2014    POSITIVE NON 16/18 (PAP NORMAL)   • HPV in female 2015    POSITIVE NON 16/18 (PAP NORMAL)   • HPV in female 2015    POSITIVE NON 16/18 (PAP NORMAL)   • Moderate dysplasia of cervix (ADIN II)        No Known Allergies    Past Surgical History:   Procedure Laterality Date   •  SECTION  10/1996   •  SECTION  2003   • LEEP  2014    DR BYNUM       Family History   Problem Relation Age of Onset   • Thyroid disease Mother    • Hypertension Mother    • Diabetes Mother    • Hypertension Brother        Social History      Socioeconomic History   • Marital status: Single   Tobacco Use   • Smoking status: Never Smoker   • Smokeless tobacco: Never Used   Substance and Sexual Activity   • Alcohol use: No   • Drug use: No   • Sexual activity: Not Currently           Objective   Physical Exam  Vitals and nursing note reviewed.   Constitutional:       Appearance: Normal appearance.   HENT:      Head: Normocephalic and atraumatic.      Right Ear: Tympanic membrane normal.      Left Ear: Tympanic membrane normal.      Nose: Nose normal.      Mouth/Throat:      Mouth: Mucous membranes are moist.   Eyes:      General: No scleral icterus.     Pupils: Pupils are equal, round, and reactive to light.   Cardiovascular:      Rate and Rhythm: Normal rate and regular rhythm.   Pulmonary:      Effort: Pulmonary effort is normal.   Abdominal:      General: Abdomen is flat.   Musculoskeletal:      Comments: Left lower leg has 2 lacerations and 3 small abrasions    Skin:     General: Skin is warm.   Neurological:      General: No focal deficit present.      Mental Status: She is alert and oriented to person, place, and time.   Psychiatric:         Mood and Affect: Mood normal.         Laceration Repair    Date/Time: 2/18/2022 11:00 PM  Performed by: Tessa Lang DO  Authorized by: Tessa Lang DO     Consent:     Consent obtained:  Verbal    Consent given by:  Patient    Risks discussed:  Infection, need for additional repair, nerve damage, poor wound healing, pain, poor cosmetic result, tendon damage, retained foreign body and vascular damage    Alternatives discussed:  Delayed treatment  Anesthesia (see MAR for exact dosages):     Anesthesia method:  Local infiltration    Local anesthetic:  Lidocaine 1% w/o epi  Laceration details:     Location:  Leg    Leg location:  L lower leg    Length (cm):  5  Repair type:     Repair type:  Simple  Pre-procedure details:     Preparation:  Patient was prepped and draped in usual sterile  fashion and imaging obtained to evaluate for foreign bodies  Exploration:     Contaminated: no    Treatment:     Area cleansed with:  Hibiclens    Amount of cleaning:  Standard    Visualized foreign bodies/material removed: no    Skin repair:     Repair method:  Sutures    Suture size:  4-0    Suture material:  Prolene    Suture technique:  Simple interrupted    Number of sutures:  8  Approximation:     Approximation:  Close  Post-procedure details:     Dressing:  Non-adherent dressing    Patient tolerance of procedure:  Tolerated well, no immediate complications  Comments:      3 lacerations side by side  The medial has 3 suture  The lateralhas 5               ED Course                                                 MDM  Number of Diagnoses or Management Options  Laceration of calf without complication, left, initial encounter: new and requires workup      Final diagnoses:   Laceration of calf without complication, left, initial encounter       ED Disposition  ED Disposition     ED Disposition Condition Comment    Discharge Stable           Divina Peterson MD  [address] [facility] [address] [phone]    Schedule an appointment as soon as possible for a visit in 1 week           Medication List      New Prescriptions    cephalexin 500 MG capsule  Commonly known as: KEFLEX  Take 1 capsule by mouth 3 (Three) Times a Day for 7 days.     ibuprofen 800 MG tablet  Commonly known as: ADVIL,MOTRIN  Take 1 tablet by mouth Every 6 (Six) Hours As Needed for Mild Pain .           Where to Get Your Medications      These medications were sent to [facility]/pharmacy #[id number] - [place], [place] - [address] - [phone]  - [phone]   [address] [address]    Phone: [phone]   · cephalexin 500 MG capsule  · ibuprofen 800 MG tablet          Tessa Lang DO  02/25/22 1017

## 2022-08-09 RX ORDER — DULOXETIN HYDROCHLORIDE 30 MG/1
1 CAPSULE, DELAYED RELEASE ORAL DAILY
COMMUNITY
End: 2022-08-25

## 2022-08-09 RX ORDER — BUPROPION HYDROCHLORIDE 150 MG/1
1 TABLET ORAL DAILY
COMMUNITY
End: 2022-08-09

## 2022-08-09 RX ORDER — ESCITALOPRAM OXALATE 10 MG/1
1 TABLET ORAL DAILY
COMMUNITY
End: 2022-08-25

## 2022-08-09 RX ORDER — CYCLOBENZAPRINE HCL 10 MG
TABLET ORAL
COMMUNITY
Start: 2022-04-19 | End: 2022-09-07

## 2022-08-09 RX ORDER — VENLAFAXINE HYDROCHLORIDE 75 MG/1
1 CAPSULE, EXTENDED RELEASE ORAL DAILY
COMMUNITY
End: 2022-08-25

## 2022-08-09 RX ORDER — BUSPIRONE HYDROCHLORIDE 15 MG/1
1 TABLET ORAL 2 TIMES DAILY PRN
COMMUNITY
Start: 2022-02-22

## 2022-08-09 RX ORDER — IBUPROFEN 800 MG/1
1 TABLET ORAL EVERY 6 HOURS PRN
COMMUNITY
End: 2022-08-09

## 2022-08-09 RX ORDER — FLUOXETINE 10 MG/1
1 CAPSULE ORAL DAILY
COMMUNITY
End: 2022-08-25

## 2022-08-09 RX ORDER — TRAMADOL HYDROCHLORIDE 50 MG/1
50 TABLET ORAL EVERY 6 HOURS PRN
COMMUNITY
End: 2022-08-25

## 2022-08-25 ENCOUNTER — OFFICE VISIT (OUTPATIENT)
Dept: GASTROENTEROLOGY | Facility: CLINIC | Age: 52
End: 2022-08-25

## 2022-08-25 VITALS
WEIGHT: 108 LBS | DIASTOLIC BLOOD PRESSURE: 68 MMHG | BODY MASS INDEX: 19.14 KG/M2 | HEIGHT: 63 IN | TEMPERATURE: 97.8 F | SYSTOLIC BLOOD PRESSURE: 120 MMHG

## 2022-08-25 DIAGNOSIS — Z12.11 ENCOUNTER FOR SCREENING FOR MALIGNANT NEOPLASM OF COLON: Primary | ICD-10-CM

## 2022-08-25 PROCEDURE — S0285 CNSLT BEFORE SCREEN COLONOSC: HCPCS | Performed by: NURSE PRACTITIONER

## 2022-08-25 RX ORDER — PHENOL 1.4 %
10 AEROSOL, SPRAY (ML) MUCOUS MEMBRANE NIGHTLY
COMMUNITY

## 2022-08-25 RX ORDER — ACETAMINOPHEN 500 MG
500 TABLET ORAL EVERY 6 HOURS PRN
COMMUNITY

## 2022-08-25 RX ORDER — POLYETHYLENE GLYCOL 3350, SODIUM SULFATE, SODIUM CHLORIDE, POTASSIUM CHLORIDE, ASCORBIC ACID, SODIUM ASCORBATE 140-9-5.2G
1 KIT ORAL TAKE AS DIRECTED
Qty: 1 EACH | Refills: 0 | Status: SHIPPED | OUTPATIENT
Start: 2022-08-25 | End: 2022-08-30

## 2022-08-25 RX ORDER — CAFFEINE 200 MG
200 TABLET ORAL EVERY 4 HOURS PRN
COMMUNITY

## 2022-08-25 RX ORDER — SODIUM CHLORIDE 9 MG/ML
70 INJECTION, SOLUTION INTRAVENOUS CONTINUOUS PRN
Status: CANCELLED | OUTPATIENT
Start: 2022-08-25

## 2022-08-25 NOTE — PROGRESS NOTES
New Patient Consult      Date: 2022   Patient Name: Ras Echevarria  MRN: [id number]  : 1970     Primary Care Provider: Divina Peterson MD    Chief Complaint   Patient presents with   • Colon Cancer Screening     History of Present Illness: Ras Echevarria is a 52 y.o. female who is here today to establish care with gastroenterology for colon cancer screening.      The patient denies recent change in bowel habits. There is no diarrhea or constipation. There is no history of abdominal pain. There is no history of overt GI bleed (hematemesis melena or hematochezia). The patient denies nausea or vomiting. There is no history of reflux. The patient denies dysphagia or odynophagia. There is no history of recent significant weight loss. There is no history of liver disease in the past. There is a family history of colon cancer in an aunt and her cousin. No other family history of GI malignancy. The patient has not had a colonoscopy in the past.    Subjective      Past Medical History:   Diagnosis Date   • Abnormal Pap smear of cervix    • Anxiety    • Depression    • H/O colposcopy with cervical biopsy 2014    MILD TO MODERATE DYSPLASIA ( DR BYNUM)   • History of colposcopy 2015    NO BIOPSY TAKEN (DR BYNUM)   • HPV in female 2013    NON 16/18 POSITIVE ON PAP (PAP NORMAL)   • HPV in female 2014    POSITIVE NON 16/18 (PAP NORMAL)   • HPV in female 2015    POSITIVE NON 16/18 (PAP NORMAL)   • HPV in female 2015    POSITIVE NON 16/18 (PAP NORMAL)   • Insomnia    • Lateral epicondylitis of right elbow    • Moderate dysplasia of cervix (ADIN II)    • Vitamin D deficiency      Past Surgical History:   Procedure Laterality Date   •  SECTION  10/1996   •  SECTION  2003   • LEEP  2014    DR BYNUM     Family History   Problem Relation Age of Onset   • Colon polyps Mother    • Thyroid disease Mother    • Hypertension Mother    •  Diabetes Mother    • Hypertension Brother    • Colon cancer Maternal Aunt    • Colon cancer Cousin    • Liver cancer Neg Hx    • Rectal cancer Neg Hx    • Stomach cancer Neg Hx      Social History     Socioeconomic History   • Marital status:    Tobacco Use   • Smoking status: Never Smoker   • Smokeless tobacco: Never Used   Vaping Use   • Vaping Use: Never used   Substance and Sexual Activity   • Alcohol use: Yes     Comment: occasional   • Drug use: No   • Sexual activity: Defer       Current Outpatient Medications:   •  acetaminophen (TYLENOL) 500 MG tablet, Take 500 mg by mouth Every 6 (Six) Hours As Needed for Mild Pain ., Disp: , Rfl:   •  buPROPion XL (WELLBUTRIN XL) 300 MG 24 hr tablet, Take 300 mg by mouth Daily., Disp: , Rfl:   •  busPIRone (BUSPAR) 15 MG tablet, Take 1 tablet by mouth 2 (Two) Times a Day As Needed., Disp: , Rfl:   •  caffeine (No Doz Maximum Strength) 200 MG tablet, Take 200 mg by mouth Every 4 (Four) Hours As Needed for Headache., Disp: , Rfl:   •  ibuprofen (ADVIL,MOTRIN) 800 MG tablet, Take 1 tablet by mouth Every 6 (Six) Hours As Needed for Mild Pain ., Disp: 20 tablet, Rfl: 0  •  Melatonin 10 MG tablet, Take 10 mg by mouth Every Night., Disp: , Rfl:   •  norgestimate-ethinyl estradiol (SPRINTEC 28) 0.25-35 MG-MCG per tablet, Take 1 tablet by mouth Daily., Disp: 84 tablet, Rfl: 3  •  Pseudoephedrine HCl (DECONGESTANT PO), Take 1 tablet by mouth As Needed., Disp: , Rfl:   •  cyclobenzaprine (FLEXERIL) 10 MG tablet, Take 1 tablet as needed by oral route at bedtime for 30 days., Disp: , Rfl:   •  PEG-KCl-NaCl-NaSulf-Na Asc-C (Plenvu) 140 g reconstituted solution solution, Take 140 g by mouth Take As Directed. Take 1 kit orally as directed for colonoscopy prep., Disp: 1 each, Rfl: 0     No Known Allergies     The following portions of the patient's history were reviewed and updated as appropriate: allergies, current medications, past family history, past medical history, past  "social history, past surgical history and problem list.    Objective     Physical Exam  Vitals and nursing note reviewed.   Constitutional:       General: She is not in acute distress.     Appearance: Normal appearance. She is well-developed.   HENT:      Head: Normocephalic and atraumatic.      Mouth/Throat:      Mouth: Mucous membranes are not pale, not dry and not cyanotic.   Eyes:      General: Lids are normal.   Neck:      Trachea: Trachea normal.   Cardiovascular:      Rate and Rhythm: Normal rate.   Pulmonary:      Effort: Pulmonary effort is normal. No respiratory distress.      Breath sounds: Normal breath sounds.   Abdominal:      Tenderness: There is no abdominal tenderness.   Skin:     General: Skin is warm and dry.   Neurological:      Mental Status: She is alert and oriented to person, place, and time.   Psychiatric:         Mood and Affect: Mood normal.         Speech: Speech normal.         Behavior: Behavior normal. Behavior is cooperative.       Vitals:    08/25/22 0847   BP: 120/68   Temp: 97.8 °F (36.6 °C)   Weight: 49 kg (108 lb)   Height: 160 cm (63\")     Body mass index is 19.13 kg/m².     Results Review:   I have reviewed the patient's new clinical and imaging results.    No visits with results within 90 Day(s) from this visit.   Latest known visit with results is:     SCANNED - LABS (01/13/2022)     No radiology results for the last 90 days.     Assessment / Plan      1. Encounter for screening for malignant neoplasm of colon  She has not had a colonoscopy in the past. There is a family history of colon cancer in an aunt and a cousin.   Colonoscopy for colon cancer screening.     - Case Request; Standing  - Case Request  - PEG-KCl-NaCl-NaSulf-Na Asc-C (Plenvu) 140 g reconstituted solution solution; Take 140 g by mouth Take As Directed. Take 1 kit orally as directed for colonoscopy prep.  Dispense: 1 each; Refill: 0    Patient Instructions   1. Colonoscopy: The indications, technique, " alternatives and potential risk and complications were discussed with the patient including but not limited to bleeding, perforations, missing lesions and anesthetic complications. The patient understands and wishes to proceed with the procedure and has given their verbal consent. Written patient education information was given to the patient.   2. The patient will call if they have further questions before procedure.       Nidai Sims, APRN  8/25/2022    Please note that portions of this note may have been completed with a voice recognition program. Efforts were made to edit the dictations, but occasionally words are mistranscribed.

## 2022-08-29 PROBLEM — Z12.11 ENCOUNTER FOR SCREENING FOR MALIGNANT NEOPLASM OF COLON: Status: ACTIVE | Noted: 2022-08-29

## 2022-08-30 ENCOUNTER — TELEPHONE (OUTPATIENT)
Dept: GASTROENTEROLOGY | Facility: CLINIC | Age: 52
End: 2022-08-30

## 2022-08-30 DIAGNOSIS — Z12.11 ENCOUNTER FOR SCREENING FOR MALIGNANT NEOPLASM OF COLON: Primary | ICD-10-CM

## 2022-08-30 RX ORDER — SODIUM, POTASSIUM,MAG SULFATES 17.5-3.13G
SOLUTION, RECONSTITUTED, ORAL ORAL
Qty: 354 ML | Refills: 0 | Status: SHIPPED | OUTPATIENT
Start: 2022-08-30 | End: 2022-08-30

## 2022-08-30 RX ORDER — BISACODYL 5 MG
TABLET, DELAYED RELEASE (ENTERIC COATED) ORAL
Qty: 4 TABLET | Refills: 0 | Status: SHIPPED | OUTPATIENT
Start: 2022-08-30

## 2022-08-30 NOTE — TELEPHONE ENCOUNTER
----- Message from Dee Mancera MA sent at 8/30/2022  1:44 PM EDT -----  Suprep not covered either.

## 2022-09-07 NOTE — PRE-PROCEDURE INSTRUCTIONS
PAT phone history completed with pt for upcoming procedure on  9/8/22 with Dr. Bear.    PAT PASS GIVEN/REVIEWED WITH PT.  VERBALIZED UNDERSTANDING OF THE FOLLOWING:  DO NOT EAT, DRINK, SMOKE, USE SMOKELESS TOBACCO OR CHEW GUM AFTER MIDNIGHT THE NIGHT BEFORE SURGERY.  THIS ALSO INCLUDES HARD CANDIES AND MINTS.    DO NOT SHAVE THE AREA TO BE OPERATED ON AT LEAST 48 HOURS PRIOR TO THE PROCEDURE.  DO NOT WEAR MAKE UP OR NAIL POLISH.  DO NOT LEAVE IN ANY PIERCING OR WEAR JEWELRY THE DAY OF SURGERY.      DO NOT USE ADHESIVES IF YOU WEAR DENTURES.    DO NOT WEAR EYE CONTACTS; BRING IN YOUR GLASSES.    ONLY TAKE MEDICATION THE MORNING OF YOUR PROCEDURE IF INSTRUCTED BY YOUR SURGEON WITH ENOUGH WATER TO SWALLOW THE MEDICATION.  IF YOUR SURGEON DID NOT SPECIFY WHICH MEDICATIONS TO TAKE, YOU WILL NEED TO CALL THEIR OFFICE FOR FURTHER INSTRUCTIONS AND DO AS THEY INSTRUCT.    LEAVE ANYTHING YOU CONSIDER VALUABLE AT HOME.    YOU WILL NEED TO ARRANGE FOR SOMEONE TO DRIVE YOU HOME AFTER SURGERY.  IT IS RECOMMENDED THAT YOU DO NOT DRIVE, WORK, DRINK ALCOHOL OR MAKE MAJOR DECISIONS FOR AT LEAST 24 HOURS AFTER YOUR PROCEDURE IS COMPLETE.      THE DAY OF YOUR PROCEDURE, BRING IN THE FOLLOWING IF APPLICABLE:   PICTURE ID AND INSURANCE/MEDICARE OR MEDICAID CARDS/ANY CO-PAY THAT MAY BE DUE   COPY OF ADVANCED DIRECTIVE/LIVING WILL/POWER OR    CPAP/BIPAP/INHALERS   SKIN PREP SHEET   YOUR PREADMISSION TESTING PASS (IF NOT A PHONE HISTORY)

## 2022-09-08 ENCOUNTER — ANESTHESIA EVENT (OUTPATIENT)
Dept: GASTROENTEROLOGY | Facility: HOSPITAL | Age: 52
End: 2022-09-08

## 2022-09-08 ENCOUNTER — HOSPITAL ENCOUNTER (OUTPATIENT)
Facility: HOSPITAL | Age: 52
Setting detail: HOSPITAL OUTPATIENT SURGERY
Discharge: HOME | End: 2022-09-08
Attending: INTERNAL MEDICINE | Admitting: INTERNAL MEDICINE

## 2022-09-08 ENCOUNTER — ANESTHESIA (OUTPATIENT)
Dept: GASTROENTEROLOGY | Facility: HOSPITAL | Age: 52
End: 2022-09-08

## 2022-09-08 VITALS
WEIGHT: 113 LBS | SYSTOLIC BLOOD PRESSURE: 113 MMHG | DIASTOLIC BLOOD PRESSURE: 75 MMHG | OXYGEN SATURATION: 100 % | HEIGHT: 63 IN | HEART RATE: 82 BPM | RESPIRATION RATE: 20 BRPM | BODY MASS INDEX: 20.02 KG/M2 | TEMPERATURE: 98.2 F

## 2022-09-08 DIAGNOSIS — Z12.11 ENCOUNTER FOR SCREENING FOR MALIGNANT NEOPLASM OF COLON: ICD-10-CM

## 2022-09-08 LAB
B-HCG UR QL: NEGATIVE
EXPIRATION DATE: NORMAL
INTERNAL NEGATIVE CONTROL: NEGATIVE
INTERNAL POSITIVE CONTROL: NORMAL
Lab: NORMAL

## 2022-09-08 PROCEDURE — 81025 URINE PREGNANCY TEST: CPT | Performed by: INTERNAL MEDICINE

## 2022-09-08 PROCEDURE — 45378 DIAGNOSTIC COLONOSCOPY: CPT | Performed by: INTERNAL MEDICINE

## 2022-09-08 PROCEDURE — 25010000002 PROPOFOL 200 MG/20ML EMULSION: Performed by: NURSE ANESTHETIST, CERTIFIED REGISTERED

## 2022-09-08 RX ORDER — LIDOCAINE HYDROCHLORIDE 20 MG/ML
INJECTION, SOLUTION INTRAVENOUS AS NEEDED
Status: DISCONTINUED | OUTPATIENT
Start: 2022-09-08 | End: 2022-09-08 | Stop reason: SURG

## 2022-09-08 RX ORDER — SODIUM CHLORIDE 9 MG/ML
70 INJECTION, SOLUTION INTRAVENOUS CONTINUOUS PRN
Status: DISCONTINUED | OUTPATIENT
Start: 2022-09-08 | End: 2022-09-08 | Stop reason: HOSPADM

## 2022-09-08 RX ORDER — PROPOFOL 10 MG/ML
INJECTION, EMULSION INTRAVENOUS AS NEEDED
Status: DISCONTINUED | OUTPATIENT
Start: 2022-09-08 | End: 2022-09-08 | Stop reason: SURG

## 2022-09-08 RX ADMIN — LIDOCAINE HYDROCHLORIDE 60 MG: 20 INJECTION, SOLUTION INTRAVENOUS at 10:05

## 2022-09-08 RX ADMIN — PROPOFOL 200 MG: 10 INJECTION, EMULSION INTRAVENOUS at 10:05

## 2022-09-08 RX ADMIN — SODIUM CHLORIDE 70 ML/HR: 9 INJECTION, SOLUTION INTRAVENOUS at 09:34

## 2022-09-08 RX ADMIN — PROPOFOL 200 MG: 10 INJECTION, EMULSION INTRAVENOUS at 10:16

## 2022-09-08 NOTE — ANESTHESIA PREPROCEDURE EVALUATION
Anesthesia Evaluation     Patient summary reviewed and Nursing notes reviewed   NPO Solid Status: > 8 hours  NPO Liquid Status: > 8 hours           Airway   Mallampati: II  TM distance: >3 FB  Neck ROM: full  No difficulty expected  Dental - normal exam     Pulmonary - negative pulmonary ROS    breath sounds clear to auscultation  (-) not a smoker  Cardiovascular - negative cardio ROS  Exercise tolerance: good (4-7 METS)    Rhythm: regular  Rate: normal        Neuro/Psych  (+) psychiatric history Depression and Anxiety,    GI/Hepatic/Renal/Endo - negative ROS     Musculoskeletal     Abdominal    Substance History - negative use     OB/GYN negative ob/gyn ROS         Other   arthritis,    history of cancer (cervical)                    Anesthesia Plan    ASA 2     MAC     intravenous induction     Anesthetic plan, risks, benefits, and alternatives have been provided, discussed and informed consent has been obtained with: patient.  Pre-procedure education provided  Plan discussed with CRNA.        CODE STATUS:

## 2022-09-08 NOTE — H&P
[facility]  HISTORY AND PHYSICAL    Patient Name: Ras Echevarria  : 1970  MRN: [id number]    Chief Complaint:   For screening colonoscopy    History Of Presenting Illness:    High risk screening colon      Past Medical History:   Diagnosis Date   • Abnormal Pap smear of cervix    • Anxiety    • Depression    • H/O colposcopy with cervical biopsy 2014    MILD TO MODERATE DYSPLASIA ( DR BYNUM)   • History of colposcopy 2015    NO BIOPSY TAKEN (DR BYNUM)   • HPV in female 2013    NON 16/18 POSITIVE ON PAP (PAP NORMAL)   • HPV in female 2014    POSITIVE NON 16/18 (PAP NORMAL)   • HPV in female 2015    POSITIVE NON 16/18 (PAP NORMAL)   • HPV in female 2015    POSITIVE NON 16/18 (PAP NORMAL)   • Insomnia    • Lateral epicondylitis of right elbow    • Moderate dysplasia of cervix (ADIN II)    • Spinal headache    • Vitamin D deficiency    • Wears glasses        Past Surgical History:   Procedure Laterality Date   •  SECTION  10/1996   •  SECTION  2003   • LEEP  2014    DR BYNUM       Social History     Socioeconomic History   • Marital status:    Tobacco Use   • Smoking status: Never Smoker   • Smokeless tobacco: Never Used   Vaping Use   • Vaping Use: Never used   Substance and Sexual Activity   • Alcohol use: Yes     Comment: occasional   • Drug use: No   • Sexual activity: Defer       Family History   Problem Relation Age of Onset   • Colon polyps Mother    • Thyroid disease Mother    • Hypertension Mother    • Diabetes Mother    • Hypertension Brother    • Colon cancer Maternal Aunt    • Colon cancer Cousin    • Liver cancer Neg Hx    • Rectal cancer Neg Hx    • Stomach cancer Neg Hx        Prior to Admission Medications:  Medications Prior to Admission   Medication Sig Dispense Refill Last Dose   • acetaminophen (TYLENOL) 500 MG tablet Take 500 mg by mouth Every 6 (Six) Hours As Needed for Mild Pain .   Past  Week at Unknown time   • bisacodyl (Dulcolax) 5 MG EC tablet Follow instructions given at office 4 tablet 0 9/7/2022 at 1600   • buPROPion XL (WELLBUTRIN XL) 300 MG 24 hr tablet Take 300 mg by mouth Daily.   9/7/2022 at 0900   • busPIRone (BUSPAR) 15 MG tablet Take 1 tablet by mouth 2 (Two) Times a Day As Needed.   9/7/2022 at 0900   • caffeine 200 MG tablet Take 200 mg by mouth Every 4 (Four) Hours As Needed for Headache.   Past Week at Unknown time   • ibuprofen (ADVIL,MOTRIN) 800 MG tablet Take 1 tablet by mouth Every 6 (Six) Hours As Needed for Mild Pain . 20 tablet 0 Past Week at Unknown time   • Melatonin 10 MG tablet Take 10 mg by mouth Every Night.   9/7/2022 at 1800   • norgestimate-ethinyl estradiol (SPRINTEC 28) 0.25-35 MG-MCG per tablet Take 1 tablet by mouth Daily. 84 tablet 3 9/7/2022 at 0900   • polyethylene glycol (GoLYTELY) 236 g solution Follow instructions given at office 4000 mL 0 9/8/2022 at 0700       Allergies:  No Known Allergies     Vitals: Temp:  [98.2 °F (36.8 °C)] 98.2 °F (36.8 °C)  Heart Rate:  [94] 94  Resp:  [16] 16  BP: (112)/(63) 112/63    Review Of Systems:  Constitutional:  Negative for chills, fever, and unexpected weight change.  Respiratory:  Negative for cough, chest tightness, shortness of breath, and wheezing.  Cardiovascular:  Negative for chest pain, palpitations, and leg swelling.  Gastrointestinal:  Negative for abdominal distention, abdominal pain, Nausea, vomiting.  Neurological:  Negative for Weakness, numbness, and headaches.     Physical Exam:    General Appearance:  Alert, cooperative, in no acute distress.   Lungs:   Clear to auscultation, respirations regular, even and                 unlabored.   Heart:  Regular rhythm and normal rate.   Abdomen:   Normal bowel sounds, no masses, no organomegaly. Soft, non-tender, non-distended   Neurologic: Alert and oriented x 3. Moves all four limbs equally       Plan: COLONOSCOPY (N/A)     Kenna Bear  MD  9/8/2022

## 2022-09-08 NOTE — ANESTHESIA POSTPROCEDURE EVALUATION
Patient: Ras CONN Handshoe    Procedure Summary     Date: 09/08/22 Room / Location: [facility] ENDOSCOPY 2 / [facility] ENDOSCOPY    Anesthesia Start: 0959 Anesthesia Stop: 1023    Procedure: COLONOSCOPY (N/A Anus) Diagnosis:       Encounter for screening for malignant neoplasm of colon      (Encounter for screening for malignant neoplasm of colon [Z12.11])    Surgeons: Kenna Bear MD Provider: Aba Redman CRNA    Anesthesia Type: MAC ASA Status: 2          Anesthesia Type: MAC    Vitals  No vitals data found for the desired time range.          Post Anesthesia Care and Evaluation    Patient location during evaluation: bedside  Patient participation: complete - patient participated  Level of consciousness: awake  Pain score: 0  Pain management: adequate    Airway patency: patent  Anesthetic complications: No anesthetic complications  PONV Status: controlled  Cardiovascular status: acceptable and stable  Respiratory status: acceptable and room air  Hydration status: acceptable    Comments: See nursing documentation for post op vital signs

## 2022-09-08 NOTE — DISCHARGE INSTRUCTIONS
Rest today  No pushing,pulling,tugging,heavy lifting, or strenuous activity   No major decision making,driving,or drinking alcoholic beverages for 24 hours due to the sedation you received  Always use good hand hygiene/washing technique  No driving on pain medication.     To assist you in voiding:  Drink plenty of fluids  Listen to running water while attempting to void.    If you are unable to urinate and you have an uncomfortable urge to void or it has been   6 hours since you were discharged, return to the Emergency Room.    - Discharge patient to home (ambulatory).   - High fiber diet.   - Continue present medications.   - Await pathology results.   - Repeat colonoscopy in 5 years for screening purposes due to family h/o two 2nd degree family members with colon cancer .   - Return to GI office PRN.

## 2024-02-19 NOTE — PROGRESS NOTES
Patient: Ras Echevarria    YOB: 1970    Date: 02/21/2024    Primary Care Provider: Divina Peterson MD    Chief Complaint   Patient presents with    Abdominal Pain       SUBJECTIVE:    History of present illness:  I saw the patient in the office today as a consultation for evaluation and treatment of abdominal pain.  She had an ultrasound that indicates gallstones.  Patient has significant pain for the last 3 months, mainly right upper quadrant rating to her back.  Associate with fatty foods and eating out.  No significant heartburn symptoms.    The following portions of the patient's history were reviewed and updated as appropriate: allergies, current medications, past family history, past medical history, past social history, past surgical history and problem list.    Review of Systems   Constitutional:  Negative for chills, fever and unexpected weight change.   HENT:  Negative for hearing loss, trouble swallowing and voice change.    Eyes:  Negative for visual disturbance.   Respiratory:  Negative for apnea, cough, chest tightness, shortness of breath and wheezing.    Cardiovascular:  Negative for chest pain, palpitations and leg swelling.   Gastrointestinal:  Positive for abdominal pain. Negative for abdominal distention, anal bleeding, blood in stool, constipation, diarrhea, nausea, rectal pain and vomiting.   Endocrine: Negative for cold intolerance and heat intolerance.   Genitourinary:  Negative for difficulty urinating, dysuria and flank pain.   Musculoskeletal:  Negative for back pain and gait problem.   Skin:  Negative for color change, rash and wound.   Neurological:  Negative for dizziness, syncope, speech difficulty, weakness, light-headedness, numbness and headaches.   Hematological:  Negative for adenopathy. Does not bruise/bleed easily.   Psychiatric/Behavioral:  Negative for confusion. The patient is not nervous/anxious.        History:  Past Medical History:   Diagnosis Date     Abnormal Pap smear of cervix     Anxiety     Depression     H/O colposcopy with cervical biopsy 2014    MILD TO MODERATE DYSPLASIA ( DR BYNUM)    History of colposcopy 2015    NO BIOPSY TAKEN (DR BYNUM)    HPV in female 2013    NON 16/18 POSITIVE ON PAP (PAP NORMAL)    HPV in female 2014    POSITIVE NON 16/18 (PAP NORMAL)    HPV in female 2015    POSITIVE NON 16/18 (PAP NORMAL)    HPV in female 2015    POSITIVE NON 16/18 (PAP NORMAL)    Insomnia     Lateral epicondylitis of right elbow     Moderate dysplasia of cervix (ADIN II)     Spinal headache     Vitamin D deficiency     Wears glasses        Past Surgical History:   Procedure Laterality Date     SECTION  10/1996     SECTION  2003    COLONOSCOPY N/A 2022    Procedure: COLONOSCOPY;  Surgeon: Kenna Bear MD;  Location: [facility] ENDOSCOPY;  Service: Gastroenterology;  Laterality: N/A;    LEEP  2014    DR BYNUM       Family History   Problem Relation Age of Onset    Colon polyps Mother     Thyroid disease Mother     Hypertension Mother     Diabetes Mother     Hypertension Brother     Colon cancer Maternal Aunt     Colon cancer Cousin     Liver cancer Neg Hx     Rectal cancer Neg Hx     Stomach cancer Neg Hx        Social History     Tobacco Use    Smoking status: Never    Smokeless tobacco: Never   Vaping Use    Vaping Use: Never used   Substance Use Topics    Alcohol use: Yes     Comment: occasional    Drug use: No       Allergies:  No Known Allergies    Medications:    Current Outpatient Medications:     acetaminophen (TYLENOL) 500 MG tablet, Take 1 tablet by mouth Every 6 (Six) Hours As Needed for Mild Pain., Disp: , Rfl:     buPROPion XL (WELLBUTRIN XL) 300 MG 24 hr tablet, Take 1 tablet by mouth Daily., Disp: , Rfl:     caffeine 200 MG tablet, Take 1 tablet by mouth Every 4 (Four) Hours As Needed for Headache., Disp: , Rfl:     ibuprofen (ADVIL,MOTRIN) 800 MG tablet, Take 1  "tablet by mouth Every 6 (Six) Hours As Needed for Mild Pain ., Disp: 20 tablet, Rfl: 0    Melatonin 10 MG tablet, Take 1 tablet by mouth Every Night., Disp: , Rfl:     norgestimate-ethinyl estradiol (SPRINTEC 28) 0.25-35 MG-MCG per tablet, Take 1 tablet by mouth Daily., Disp: 84 tablet, Rfl: 3    SUMAtriptan (IMITREX) 50 MG tablet, TAKE 1 TABLET AT ONSET OF MIGRAINE, CAN REPEAT DOSE IN 1 HOUR IF NOT IMPROVED, Disp: , Rfl:     Symbicort 160-4.5 MCG/ACT inhaler, Inhale 2 puffs 2 (Two) Times a Day., Disp: , Rfl:     bisacodyl (Dulcolax) 5 MG EC tablet, Follow instructions given at office (Patient not taking: Reported on 2/21/2024), Disp: 4 tablet, Rfl: 0    busPIRone (BUSPAR) 15 MG tablet, Take 1 tablet by mouth 2 (Two) Times a Day As Needed. (Patient not taking: Reported on 2/21/2024), Disp: , Rfl:     OBJECTIVE:    Vital Signs:   Vitals:    02/21/24 1031   BP: 104/68   Pulse: 73   Temp: 97.5 °F (36.4 °C)   SpO2: 96%   Weight: 59 kg (130 lb)   Height: 160 cm (62.99\")       Physical Exam:   General Appearance:    Alert, cooperative, in no acute distress   Head:    Normocephalic, without obvious abnormality, atraumatic   Eyes:            Lids and lashes normal, conjunctivae and sclerae normal, no   icterus, no pallor, corneas clear, PERRLA   Ears:    Ears appear intact with no abnormalities noted   Throat:   No oral lesions, no thrush, oral mucosa moist   Neck:   No adenopathy, supple, trachea midline, no thyromegaly, no   carotid bruit, no JVD   Lungs:     Clear to auscultation,respirations regular, even and                  unlabored    Heart:    Regular rhythm and normal rate, normal S1 and S2, no            murmur   Abdomen:     no masses, no organomegaly, soft non-tender, non-distended, no guarding, there is evidence of right upper quadrant tenderness.  Also tender epigastric region.  No abdominal wall hernias or masses.   Extremities:   Moves all extremities well, no edema, no cyanosis, no             redness "   Pulses:   Pulses palpable and equal bilaterally   Skin:   No bleeding, bruising or rash   Lymph nodes:   No palpable adenopathy   Neurologic:   Cranial nerves 2 - 12 grossly intact, sensation intact      Results Review:   I reviewed the patient's new clinical results.    Review of Systems was reviewed and confirmed as accurate as documented by the MA.    ASSESSMENT/PLAN:    1. Calculus of gallbladder without cholecystitis without obstruction    2. Right upper quadrant abdominal pain        I had a detailed and extensive discussion with the patient in the office and they understand that they need to undergo laparoscopic cholecystectomy with intraoperative cholangiography or possible open cholecystectomy. Full risks and benefits of operative versus nonoperative intervention were discussed with the patient and these included things such as nonresolution of symptoms and possible worsening of symptoms without surgical intervention versus infection, bleeding, open cholecystectomy, common bile duct injury, postoperative biliary leakage, need for drain placement, possible inability to perform cholangiography due to inflammation, postoperative abscess, etc with surgical intervention. The patient understands, agrees, and wishes to proceed with the surgical treatment plan as mentioned above. The patient had no questions for me at the end of the discussion.  I did draw a picture of the anatomy for the patient and used this in my informed consent.  Patient placed on low-fat diet in interim.  Take over-the-counter antacids for occasional heartburn.     I discussed the patients findings and my recommendations with patient.    Electronically signed by Mita Carreon MD  02/21/24

## 2024-02-21 ENCOUNTER — OFFICE VISIT (OUTPATIENT)
Dept: SURGERY | Facility: CLINIC | Age: 54
End: 2024-02-21
Payer: COMMERCIAL

## 2024-02-21 VITALS
TEMPERATURE: 97.5 F | WEIGHT: 130 LBS | HEIGHT: 63 IN | SYSTOLIC BLOOD PRESSURE: 104 MMHG | OXYGEN SATURATION: 96 % | HEART RATE: 73 BPM | DIASTOLIC BLOOD PRESSURE: 68 MMHG | BODY MASS INDEX: 23.04 KG/M2

## 2024-02-21 DIAGNOSIS — R10.11 RIGHT UPPER QUADRANT ABDOMINAL PAIN: ICD-10-CM

## 2024-02-21 DIAGNOSIS — K80.20 CALCULUS OF GALLBLADDER WITHOUT CHOLECYSTITIS WITHOUT OBSTRUCTION: Primary | ICD-10-CM

## 2024-02-21 PROCEDURE — 99203 OFFICE O/P NEW LOW 30 MIN: CPT | Performed by: SURGERY

## 2024-02-21 RX ORDER — BUDESONIDE AND FORMOTEROL FUMARATE DIHYDRATE 160; 4.5 UG/1; UG/1
2 AEROSOL RESPIRATORY (INHALATION) 2 TIMES DAILY
COMMUNITY
Start: 2023-11-16

## 2024-02-21 RX ORDER — SUMATRIPTAN 50 MG/1
TABLET, FILM COATED ORAL
COMMUNITY

## 2024-03-07 ENCOUNTER — TELEPHONE (OUTPATIENT)
Dept: SURGERY | Facility: CLINIC | Age: 54
End: 2024-03-07
Payer: COMMERCIAL

## 2024-03-12 ENCOUNTER — TELEPHONE (OUTPATIENT)
Dept: SURGERY | Facility: CLINIC | Age: 54
End: 2024-03-12

## 2024-03-12 ENCOUNTER — OUTSIDE FACILITY SERVICE (OUTPATIENT)
Dept: SURGERY | Facility: CLINIC | Age: 54
End: 2024-03-12
Payer: COMMERCIAL

## 2024-03-12 DIAGNOSIS — K81.9 CHOLECYSTITIS: ICD-10-CM

## 2024-03-12 DIAGNOSIS — K81.9 CHOLECYSTITIS: Primary | ICD-10-CM

## 2024-03-12 RX ORDER — HYDROCODONE BITARTRATE AND ACETAMINOPHEN 5; 325 MG/1; MG/1
1 TABLET ORAL EVERY 6 HOURS PRN
Qty: 14 TABLET | Refills: 0 | Status: SHIPPED | OUTPATIENT
Start: 2024-03-12 | End: 2024-03-12 | Stop reason: SDUPTHER

## 2024-03-12 RX ORDER — HYDROCODONE BITARTRATE AND ACETAMINOPHEN 5; 325 MG/1; MG/1
1 TABLET ORAL EVERY 6 HOURS PRN
Qty: 14 TABLET | Refills: 0 | Status: SHIPPED | OUTPATIENT
Start: 2024-03-12

## 2024-03-12 NOTE — TELEPHONE ENCOUNTER
Oskar patients spouse called the office and the patient had gallbladder surgery this morning and went to get her pain medication from the pharmacy and was told By [facility] that they didn't carry Lortab. Can something else be sent in for the patient?

## 2024-03-12 NOTE — TELEPHONE ENCOUNTER
Patient called stating that [facility] pharmacy does not carry Hydrocodone-acetaminophen (NORCO) 5-325 MG per tablet. Please advise at [phone].

## 2024-03-27 ENCOUNTER — OFFICE VISIT (OUTPATIENT)
Dept: SURGERY | Facility: CLINIC | Age: 54
End: 2024-03-27
Payer: COMMERCIAL

## 2024-03-27 VITALS
HEART RATE: 83 BPM | DIASTOLIC BLOOD PRESSURE: 76 MMHG | OXYGEN SATURATION: 98 % | TEMPERATURE: 97.3 F | HEIGHT: 63 IN | WEIGHT: 119 LBS | SYSTOLIC BLOOD PRESSURE: 104 MMHG | BODY MASS INDEX: 21.09 KG/M2

## 2024-03-27 DIAGNOSIS — Z48.89 POSTOPERATIVE VISIT: Primary | ICD-10-CM

## 2024-03-27 PROCEDURE — 99024 POSTOP FOLLOW-UP VISIT: CPT | Performed by: SURGERY

## 2024-03-27 RX ORDER — IPRATROPIUM BROMIDE 21 UG/1
SPRAY, METERED NASAL
COMMUNITY
Start: 2024-02-29

## 2024-03-27 RX ORDER — LEVOCETIRIZINE DIHYDROCHLORIDE 5 MG/1
1 TABLET, FILM COATED ORAL DAILY
COMMUNITY
Start: 2024-02-29

## 2024-03-29 ENCOUNTER — TRANSCRIBE ORDERS (OUTPATIENT)
Dept: ADMINISTRATIVE | Facility: HOSPITAL | Age: 54
End: 2024-03-29
Payer: COMMERCIAL

## 2024-03-29 DIAGNOSIS — Z12.31 ENCOUNTER FOR SCREENING MAMMOGRAM FOR MALIGNANT NEOPLASM OF BREAST: Primary | ICD-10-CM

## 2024-07-18 ENCOUNTER — HOSPITAL ENCOUNTER (OUTPATIENT)
Dept: MAMMOGRAPHY | Facility: HOSPITAL | Age: 54
Discharge: HOME | End: 2024-07-18
Admitting: INTERNAL MEDICINE
Payer: COMMERCIAL

## 2024-07-18 DIAGNOSIS — Z12.31 ENCOUNTER FOR SCREENING MAMMOGRAM FOR MALIGNANT NEOPLASM OF BREAST: ICD-10-CM

## 2024-07-18 PROCEDURE — 77063 BREAST TOMOSYNTHESIS BI: CPT

## 2024-07-18 PROCEDURE — 77067 SCR MAMMO BI INCL CAD: CPT

## 2024-07-25 ENCOUNTER — TRANSCRIBE ORDERS (OUTPATIENT)
Dept: ADMINISTRATIVE | Facility: HOSPITAL | Age: 54
End: 2024-07-25
Payer: COMMERCIAL

## 2024-07-25 DIAGNOSIS — Z12.31 OTHER SCREENING MAMMOGRAM: Primary | ICD-10-CM

## 2024-08-23 ENCOUNTER — HOSPITAL ENCOUNTER (OUTPATIENT)
Dept: MAMMOGRAPHY | Facility: HOSPITAL | Age: 54
Discharge: HOME | End: 2024-08-23
Payer: COMMERCIAL

## 2024-08-23 ENCOUNTER — HOSPITAL ENCOUNTER (OUTPATIENT)
Dept: ULTRASOUND IMAGING | Facility: HOSPITAL | Age: 54
Discharge: HOME | End: 2024-08-23
Payer: COMMERCIAL

## 2024-08-23 DIAGNOSIS — R92.8 ABNORMAL MAMMOGRAM OF LEFT BREAST: ICD-10-CM

## 2024-08-23 DIAGNOSIS — Z12.31 OTHER SCREENING MAMMOGRAM: ICD-10-CM

## 2024-08-23 PROCEDURE — 77065 DX MAMMO INCL CAD UNI: CPT

## 2024-08-23 PROCEDURE — G0279 TOMOSYNTHESIS, MAMMO: HCPCS

## 2024-08-23 PROCEDURE — 76642 ULTRASOUND BREAST LIMITED: CPT

## 2025-05-01 ENCOUNTER — OFFICE VISIT (OUTPATIENT)
Dept: INTERNAL MEDICINE | Facility: CLINIC | Age: 55
End: 2025-05-01
Payer: COMMERCIAL

## 2025-05-01 VITALS
HEART RATE: 88 BPM | OXYGEN SATURATION: 98 % | TEMPERATURE: 96.7 F | DIASTOLIC BLOOD PRESSURE: 78 MMHG | BODY MASS INDEX: 23.18 KG/M2 | SYSTOLIC BLOOD PRESSURE: 110 MMHG | RESPIRATION RATE: 20 BRPM | HEIGHT: 63 IN | WEIGHT: 130.8 LBS

## 2025-05-01 DIAGNOSIS — R63.5 WEIGHT GAIN: ICD-10-CM

## 2025-05-01 DIAGNOSIS — E53.8 VITAMIN B12 DEFICIENCY: ICD-10-CM

## 2025-05-01 DIAGNOSIS — Z80.0 FAMILY HISTORY OF COLON CANCER: ICD-10-CM

## 2025-05-01 DIAGNOSIS — F33.1 MODERATE EPISODE OF RECURRENT MAJOR DEPRESSIVE DISORDER: ICD-10-CM

## 2025-05-01 DIAGNOSIS — E78.2 MIXED HYPERLIPIDEMIA: Primary | ICD-10-CM

## 2025-05-01 DIAGNOSIS — Z12.11 COLON CANCER SCREENING: ICD-10-CM

## 2025-05-01 DIAGNOSIS — Z01.419 ROUTINE GYNECOLOGICAL EXAMINATION: ICD-10-CM

## 2025-05-01 DIAGNOSIS — F41.9 ANXIETY: ICD-10-CM

## 2025-05-01 RX ORDER — PRENATAL VIT NO.126/IRON/FOLIC 28MG-0.8MG
TABLET ORAL DAILY
COMMUNITY

## 2025-05-01 RX ORDER — MELOXICAM 15 MG/1
15 TABLET ORAL DAILY
COMMUNITY

## 2025-05-01 RX ORDER — VILAZODONE HYDROCHLORIDE 40 MG/1
40 TABLET ORAL DAILY
COMMUNITY

## 2025-05-01 RX ORDER — NICOTINAMIDE RIBOSIDE CHLORIDE 100 %
POWDER (GRAM) MISCELLANEOUS
COMMUNITY

## 2025-05-01 RX ORDER — LANOLIN ALCOHOL/MO/W.PET/CERES
1000 CREAM (GRAM) TOPICAL DAILY
COMMUNITY

## 2025-05-01 RX ORDER — SUMATRIPTAN 50 MG/1
TABLET, FILM COATED ORAL
Qty: 9 TABLET | Refills: 3 | Status: SHIPPED | OUTPATIENT
Start: 2025-05-01

## 2025-05-01 NOTE — PROGRESS NOTES
Subjective   Ras Echevarria is a 54 y.o. female.     Chief Complaint   Patient presents with    Weight Gain     Perimenopausal Symptoms, started around the beginning of the year    Hyperlipidemia     Hpi :  Patient is here for an initial visit and due for labs of cholesterol, she complains of hot flashes and night sweats and weight gain,  She complains of irregular periods ,she has had leep  in the past , last Pap smear  by GYN , she follows with PMR  Dr. Liu , she takes as needed Imitrex for migraines and is currently on Viibryd for anxiety and depression and agrees to see psychiatry for increased symptoms , she is on several OTC supplements Which have been added to her medication list , last colonoscopy was  and she states her brother was just diagnosed with colon cancer    Past Medical History:   Diagnosis Date    Abnormal Pap smear of cervix     Anxiety     Depression     H/O colposcopy with cervical biopsy 2014    MILD TO MODERATE DYSPLASIA ( DR BYNUM)    History of colposcopy 2015    NO BIOPSY TAKEN (DR BYNUM)    HPV in female 2013    NON 16/18 POSITIVE ON PAP (PAP NORMAL)    HPV in female 2014    POSITIVE NON 16/18 (PAP NORMAL)    HPV in female 2015    POSITIVE NON 16/18 (PAP NORMAL)    HPV in female 2015    POSITIVE NON 16/18 (PAP NORMAL)    Insomnia     Lateral epicondylitis of right elbow     Moderate dysplasia of cervix (ADIN II)     Spinal headache     Vitamin D deficiency     Wears glasses        Past Surgical History:   Procedure Laterality Date     SECTION  10/1996     SECTION  2003    COLONOSCOPY N/A 2022    Procedure: COLONOSCOPY;  Surgeon: Kenna Bear MD;  Location: [facility] ENDOSCOPY;  Service: Gastroenterology;  Laterality: N/A;    LEEP  2014    DR BYNUM       Family History   Problem Relation Age of Onset    Colon polyps Mother     Thyroid disease Mother     Hypertension Mother     Diabetes  Mother     Colon cancer Brother     Hypertension Brother     Colon cancer Maternal Aunt     Colon cancer Cousin     Liver cancer Neg Hx     Rectal cancer Neg Hx     Stomach cancer Neg Hx     Breast cancer Neg Hx         reports that she has never smoked. She has never used smokeless tobacco. She reports current alcohol use. She reports that she does not use drugs.    No Known Allergies        Current Outpatient Medications:     acetaminophen (TYLENOL) 500 MG tablet, Take 1 tablet by mouth Every 6 (Six) Hours As Needed for Mild Pain., Disp: , Rfl:     ASHWAGANDHA 35 PO, Take  by mouth., Disp: , Rfl:     Berberine Chloride (BERBERINE HCI PO), Take  by mouth., Disp: , Rfl:     BLACK COHOSH PO, Take  by mouth., Disp: , Rfl:     caffeine 200 MG tablet, Take 1 tablet by mouth Every 4 (Four) Hours As Needed for Headache., Disp: , Rfl:     meloxicam (MOBIC) 15 MG tablet, Take 1 tablet by mouth Daily., Disp: , Rfl:     Multiple Minerals-Vitamins (CALCIUM CITRATE-MAG-MINERALS PO), Take  by mouth., Disp: , Rfl:     Nicotinamide Riboside Chloride powder, Use., Disp: , Rfl:     norgestimate-ethinyl estradiol (SPRINTEC 28) 0.25-35 MG-MCG per tablet, Take 1 tablet by mouth Daily., Disp: 84 tablet, Rfl: 3    prenatal vitamin (prenatal, CLASSIC, vitamin) tablet, Take  by mouth Daily., Disp: , Rfl:     Rhubarb (ESTROVEN COMPLETE PO), Take  by mouth., Disp: , Rfl:     SUMAtriptan (IMITREX) 50 MG tablet, Take one tablet at onset of headache. May repeat dose one time in 2 hours if headache not relieved., Disp: 9 tablet, Rfl: 3    Theanine 50 MG tablet dispersible, Place  on the tongue., Disp: , Rfl:     TURMERIC PO, Take  by mouth., Disp: , Rfl:     vilazodone (VIIBRYD) 40 MG tablet tablet, Take 1 tablet by mouth Daily., Disp: , Rfl:     vitamin B-12 (CYANOCOBALAMIN) 1000 MCG tablet, Take 1 tablet by mouth Daily., Disp: , Rfl:       Objective   Blood pressure 110/78, pulse 88, temperature 96.7 °F (35.9 °C), temperature source Temporal,  "resp. rate 20, height 160 cm (63\"), weight 59.3 kg (130 lb 12.8 oz), SpO2 98%, not currently breastfeeding.    Physical Exam  Vitals and nursing note reviewed.   Constitutional:       General: She is not in acute distress.     Appearance: Normal appearance. She is not diaphoretic.   HENT:      Head: Normocephalic and atraumatic.      Right Ear: External ear normal.      Left Ear: External ear normal.      Nose: Nose normal.   Eyes:      Extraocular Movements: Extraocular movements intact.      Conjunctiva/sclera: Conjunctivae normal.   Neck:      Trachea: Trachea normal.   Cardiovascular:      Rate and Rhythm: Normal rate and regular rhythm.      Heart sounds: Normal heart sounds.   Pulmonary:      Effort: Pulmonary effort is normal. No respiratory distress.      Breath sounds: Normal breath sounds.   Abdominal:      General: Abdomen is flat.   Musculoskeletal:      Cervical back: Neck supple.      Comments: Moves all limbs   Skin:     General: Skin is warm.   Neurological:      Mental Status: She is alert and oriented to person, place, and time.      Comments: No gross motor or sensory deficits         BMI is within normal parameters. No other follow-up for BMI required.      Results for orders placed or performed during the hospital encounter of 09/08/22   POC Pregnancy, Urine    Collection Time: 09/08/22  9:41 AM    Specimen: Urine   Result Value Ref Range    HCG, Urine, QL Negative Negative    Lot Number [id number]     Internal Positive Control Passed Positive, Passed    Internal Negative Control Negative Negative, Passed    Expiration Date 22,924          Assessment & Plan   Diagnoses and all orders for this visit:    1. Mixed hyperlipidemia (Primary)  -     CBC (No Diff)  -     Comprehensive Metabolic Panel  -     Lipid Panel    2. Anxiety  -     Ambulatory Referral to Psychiatry    3. Moderate episode of recurrent major depressive disorder  -     Ambulatory Referral to Psychiatry    4. Weight gain  -     " TSH    5. Colon cancer screening  -     Ambulatory Referral to Gastroenterology    6. Family history of colon cancer  -     Ambulatory Referral to Gastroenterology    7. Routine gynecological examination  -     Ambulatory Referral to Gynecology    8. Vitamin B12 deficiency  -     Vitamin B12    Other orders  -     SUMAtriptan (IMITREX) 50 MG tablet; Take one tablet at onset of headache. May repeat dose one time in 2 hours if headache not relieved.  Dispense: 9 tablet; Refill: 3      Plan:  1.   mixed hyperlipidemia: will obtain   fasting CMP and lipid panel.  Diet and exercise counseled,    2.  Anxiety disorder: Refill Viibryd 40 mg daily and obtain labs, refer patient to psychiatry  3.   Major depression: Refilled Viibryd 40 mg daily and obtain labs, refer patient to psychiatry  4.  Weight gain: Will obtain labs  5.  Colon cancer screening: Refer patient to GI  6.  Family history in brother of colon cancer: Refer patient to GI  7.  Routine GYN exam:   refer patient to GYN            Janice Sheridan MD

## 2025-05-14 RX ORDER — NORGESTIMATE AND ETHINYL ESTRADIOL 0.25-0.035
1 KIT ORAL DAILY
Qty: 84 TABLET | Refills: 0 | Status: SHIPPED | OUTPATIENT
Start: 2025-05-14

## 2025-06-10 ENCOUNTER — TELEPHONE (OUTPATIENT)
Dept: INTERNAL MEDICINE | Facility: CLINIC | Age: 55
End: 2025-06-10
Payer: COMMERCIAL

## 2025-07-22 ENCOUNTER — OFFICE VISIT (OUTPATIENT)
Dept: OBSTETRICS AND GYNECOLOGY | Facility: CLINIC | Age: 55
End: 2025-07-22
Payer: COMMERCIAL

## 2025-07-22 VITALS
WEIGHT: 133.6 LBS | BODY MASS INDEX: 23.67 KG/M2 | DIASTOLIC BLOOD PRESSURE: 68 MMHG | HEIGHT: 63 IN | SYSTOLIC BLOOD PRESSURE: 100 MMHG

## 2025-07-22 DIAGNOSIS — Z01.419 ENCOUNTER FOR GYNECOLOGICAL EXAMINATION WITHOUT ABNORMAL FINDING: Primary | ICD-10-CM

## 2025-07-22 PROCEDURE — 99386 PREV VISIT NEW AGE 40-64: CPT | Performed by: OBSTETRICS & GYNECOLOGY

## 2025-07-22 RX ORDER — ESCITALOPRAM OXALATE 10 MG/1
10 TABLET ORAL DAILY
COMMUNITY
End: 2025-07-22

## 2025-07-22 RX ORDER — IPRATROPIUM BROMIDE 21 UG/1
SPRAY, METERED NASAL
COMMUNITY
End: 2025-07-22

## 2025-07-22 RX ORDER — NORGESTIMATE AND ETHINYL ESTRADIOL 0.25-0.035
1 KIT ORAL DAILY
Qty: 84 TABLET | Refills: 4 | Status: SHIPPED | OUTPATIENT
Start: 2025-07-22

## 2025-07-22 RX ORDER — DOXYCYCLINE 100 MG/1
CAPSULE ORAL
COMMUNITY
End: 2025-07-22

## 2025-07-22 RX ORDER — MONTELUKAST SODIUM 10 MG/1
10 TABLET ORAL DAILY
COMMUNITY
Start: 2025-02-14 | End: 2025-07-22

## 2025-07-22 RX ORDER — ZOLMITRIPTAN 5 MG/1
TABLET, FILM COATED ORAL
COMMUNITY
End: 2025-07-22

## 2025-07-22 RX ORDER — LEVOCETIRIZINE DIHYDROCHLORIDE 5 MG/1
5 TABLET, FILM COATED ORAL DAILY
COMMUNITY
End: 2025-07-22

## 2025-07-22 RX ORDER — VENLAFAXINE HYDROCHLORIDE 75 MG/1
75 CAPSULE, EXTENDED RELEASE ORAL DAILY
COMMUNITY
End: 2025-07-22

## 2025-07-22 RX ORDER — BUPROPION HYDROCHLORIDE 150 MG/1
150 TABLET ORAL DAILY
COMMUNITY
End: 2025-07-22

## 2025-07-22 RX ORDER — TOPIRAMATE 25 MG/1
TABLET, FILM COATED ORAL
COMMUNITY
End: 2025-07-22

## 2025-07-22 RX ORDER — FLUOXETINE 10 MG/1
10 CAPSULE ORAL DAILY
COMMUNITY
End: 2025-07-22

## 2025-07-22 NOTE — PROGRESS NOTES
Subjective   Chief Complaint   Patient presents with    Gynecologic Exam     New Patient here for yearly exam and pap smear     Ras Echevarria is a 54 y.o. year old .  Patient's last menstrual period was 2025 (exact date).  She presents to be seen because of annual physical exam.     OTHER COMPLAINTS:  Menses still q month though light    The following portions of the patient's history were reviewed and updated as appropriate:She  has a past medical history of Abnormal Pap smear of cervix, ADHD (attention deficit hyperactivity disorder), Anxiety, Chronic pain disorder (), Depression, H/O colposcopy with cervical biopsy (2014), History of colposcopy (2015), HPV in female (2013), HPV in female (2014), HPV in female (2015), HPV in female (2015), Insomnia, Lateral epicondylitis of right elbow, Migraine, Moderate dysplasia of cervix (ADIN II) (), Osteoarthritis (), Spinal headache, Vitamin D deficiency, and Wears glasses.  She does not have any pertinent problems on file.  She  has a past surgical history that includes LEEP (2014);  section (10/1996);  section (2003); Colonoscopy (N/A, 2022); Cervical biopsy w/ loop electrode excision; and Abdominal surgery (2024).  Her family history includes Alcohol abuse in her brother and father; Colon cancer in her brother, cousin, and maternal aunt; Colon polyps in her mother; Diabetes in her mother; Hypertension in her brother and mother; Melanoma in her paternal aunt; Stroke in her mother and paternal grandfather; Thyroid disease in her mother.  She  reports that she has never smoked. She has never used smokeless tobacco. She reports current alcohol use. She reports that she does not use drugs.  Current Outpatient Medications   Medication Sig Dispense Refill    acetaminophen (TYLENOL) 500 MG tablet Take 1 tablet by mouth Every 6 (Six) Hours As Needed for Mild Pain.      ASHWAGANDHA  35 PO Take  by mouth.      Berberine Chloride (BERBERINE HCI PO) Take  by mouth.      BLACK COHOSH PO Take  by mouth.      caffeine 200 MG tablet Take 1 tablet by mouth Every 4 (Four) Hours As Needed for Headache.      meloxicam (MOBIC) 15 MG tablet Take 1 tablet by mouth Daily.      Multiple Minerals-Vitamins (CALCIUM CITRATE-MAG-MINERALS PO) Take  by mouth.      norgestimate-ethinyl estradiol (Sprintec 28) 0.25-35 MG-MCG per tablet Take 1 tablet by mouth Daily. 84 tablet 0    prenatal vitamin (prenatal, CLASSIC, vitamin) tablet Take  by mouth Daily.      SUMAtriptan (IMITREX) 50 MG tablet Take one tablet at onset of headache. May repeat dose one time in 2 hours if headache not relieved. 9 tablet 3    Theanine 50 MG tablet dispersible Place  on the tongue.      TURMERIC PO Take  by mouth.      vilazodone (VIIBRYD) 40 MG tablet tablet Take 1 tablet by mouth Daily.      vitamin B-12 (CYANOCOBALAMIN) 1000 MCG tablet Take 1 tablet by mouth Daily.       No current facility-administered medications for this visit.     Current Outpatient Medications on File Prior to Visit   Medication Sig    acetaminophen (TYLENOL) 500 MG tablet Take 1 tablet by mouth Every 6 (Six) Hours As Needed for Mild Pain.    ASHWAGANDHA 35 PO Take  by mouth.    Berberine Chloride (BERBERINE HCI PO) Take  by mouth.    BLACK COHOSH PO Take  by mouth.    caffeine 200 MG tablet Take 1 tablet by mouth Every 4 (Four) Hours As Needed for Headache.    meloxicam (MOBIC) 15 MG tablet Take 1 tablet by mouth Daily.    Multiple Minerals-Vitamins (CALCIUM CITRATE-MAG-MINERALS PO) Take  by mouth.    norgestimate-ethinyl estradiol (Sprintec 28) 0.25-35 MG-MCG per tablet Take 1 tablet by mouth Daily.    prenatal vitamin (prenatal, CLASSIC, vitamin) tablet Take  by mouth Daily.    SUMAtriptan (IMITREX) 50 MG tablet Take one tablet at onset of headache. May repeat dose one time in 2 hours if headache not relieved.    Theanine 50 MG tablet dispersible Place  on the  tongue.    TURMERIC PO Take  by mouth.    vilazodone (VIIBRYD) 40 MG tablet tablet Take 1 tablet by mouth Daily.    vitamin B-12 (CYANOCOBALAMIN) 1000 MCG tablet Take 1 tablet by mouth Daily.    [DISCONTINUED] montelukast (SINGULAIR) 10 MG tablet Take 1 tablet by mouth Daily.    [DISCONTINUED] buPROPion XL (WELLBUTRIN XL) 150 MG 24 hr tablet Take 1 tablet by mouth Daily.    [DISCONTINUED] doxycycline (VIBRAMYCIN) 100 MG capsule Take 1 capsule twice a day by oral route with meal(s) for 10 days.    [DISCONTINUED] escitalopram (LEXAPRO) 10 MG tablet Take 1 tablet by mouth Daily.    [DISCONTINUED] FLUoxetine (PROzac) 10 MG capsule Take 1 capsule by mouth Daily.    [DISCONTINUED] ipratropium (ATROVENT) 0.03 % nasal spray SPRAY 1-2 SPRAYS INTO EACH NOSTRIL EVERY 6 HOURS AS NEEDED FOR NASAL DRAINAGE    [DISCONTINUED] levocetirizine (XYZAL) 5 MG tablet Take 1 tablet by mouth Daily.    [DISCONTINUED] Nicotinamide Riboside Chloride powder Use.    [DISCONTINUED] Rhubarb (ESTROVEN COMPLETE PO) Take  by mouth.    [DISCONTINUED] topiramate (TOPAMAX) 25 MG tablet TAKE 1 TABLET BY MOUTH EVERY DAY AT BEDTIME FOR 30 DAYS    [DISCONTINUED] venlafaxine XR (EFFEXOR-XR) 75 MG 24 hr capsule Take 1 capsule by mouth Daily.    [DISCONTINUED] ZOLMitriptan (ZOMIG) 5 MG tablet      No current facility-administered medications on file prior to visit.     She has no known allergies.    Social History    Tobacco Use      Smoking status: Never      Smokeless tobacco: Never    Review of Systems  Consitutional POS: nothing reported    NEG: anorexia or night sweats   Gastointestinal POS: nothing reported    NEG: bloating, change in bowel habits, melena, or reflux symptoms   Genitourinary POS: nothing reported    NEG: dysuria or hematuria   Integument POS: nothing reported    NEG: moles that are changing in size, shape, color or rashes   Breast POS: nothing reported    NEG: persistent breast lump, skin dimpling, or nipple discharge         Respiratory:  "negative  Cardiovascular: negative  GYN:  negative          Objective   /68   Ht 160 cm (63\")   Wt 60.6 kg (133 lb 9.6 oz)   LMP 07/17/2025 (Exact Date)   BMI 23.67 kg/m²     General:  well developed; well nourished  no acute distress  mentation appropriate   Skin:  No suspicious lesions seen   Thyroid: normal to inspection and palpation   Lungs:  breathing is unlabored  clear to auscultation bilaterally   Heart:  regular rate and rhythm, S1, S2 normal, no murmur, click, rub or gallop   Breasts:  Examined in supine position  Symmetric without masses or skin dimpling  Nipples normal without inversion, lesions or discharge  There are no palpable axillary nodes   Abdomen: soft, non-tender; no masses  no umbilical or inguinal hernias are present  no hepato-splenomegaly   Pelvis: Clinical staff was present for exam  External genitalia:  normal appearance of the external genitalia including Bartholin's and [place]'s glands.  :  urethral meatus normal; urethra normal:  Vaginal:  normal pink mucosa without prolapse or lesions.  Cervix:  normal appearance.  Uterus:  normal size, shape and consistency.  Adnexa:  normal bimanual exam of the adnexa.  Rectal:  digital rectal exam not performed; anus visually normal appearing.     Psychiatric: Alert and oriented ×3, mood and affect appropriate  HEENT: Atraumatic, normocephalic, normal scleral icterus  Extremities: 2+ pulses bilaterally, no edema      Lab Review   CBC    Imaging   Mammo Diagnostic Digital Tomosynthesis Left With CAD (08/23/2024 13:20)        Assessment   PE wNL     Plan   PAP done  Diet/exercise    No orders of the defined types were placed in this encounter.         This note was electronically signed.      July 22, 2025      "

## 2025-07-23 ENCOUNTER — PATIENT ROUNDING (BHMG ONLY) (OUTPATIENT)
Dept: OBSTETRICS AND GYNECOLOGY | Facility: CLINIC | Age: 55
End: 2025-07-23
Payer: COMMERCIAL

## 2025-07-23 LAB — REF LAB TEST METHOD: NORMAL

## 2025-07-23 RX ORDER — FLUCONAZOLE 150 MG/1
150 TABLET ORAL DAILY
Qty: 1 TABLET | Refills: 0 | Status: SHIPPED | OUTPATIENT
Start: 2025-07-23

## 2025-07-23 NOTE — PROGRESS NOTES
July 23, 2025      My name is Zaida Granados      I am  with [facility]  [address], [address]  [facility] [place] [address] [phone].      I am reaching out to you to officially welcome you to our practice and ask about your recent visit.     Tell me about your visit with us. What things went well?         We're always looking for ways to make our patients' experiences even better. Do you have recommendations on ways we may improve?      Overall were you satisfied with your first visit to our practice?        I would appreciate you taking the time to complete the survey. Your feedback is greatly appreciated! Please reply directly to this e-mail should you have any questions or concerns.      Thank you, and have a great day.

## 2025-07-24 ENCOUNTER — OFFICE VISIT (OUTPATIENT)
Dept: BEHAVIORAL HEALTH | Facility: CLINIC | Age: 55
End: 2025-07-24
Payer: COMMERCIAL

## 2025-07-24 VITALS
HEIGHT: 63 IN | DIASTOLIC BLOOD PRESSURE: 72 MMHG | BODY MASS INDEX: 23.23 KG/M2 | SYSTOLIC BLOOD PRESSURE: 104 MMHG | WEIGHT: 131.1 LBS

## 2025-07-24 DIAGNOSIS — F41.1 GENERALIZED ANXIETY DISORDER: ICD-10-CM

## 2025-07-24 DIAGNOSIS — F90.0 ADHD (ATTENTION DEFICIT HYPERACTIVITY DISORDER), INATTENTIVE TYPE: Primary | ICD-10-CM

## 2025-07-24 DIAGNOSIS — F33.0 MILD EPISODE OF RECURRENT MAJOR DEPRESSIVE DISORDER: ICD-10-CM

## 2025-07-24 RX ORDER — DEXTROAMPHETAMINE SACCHARATE, AMPHETAMINE ASPARTATE, DEXTROAMPHETAMINE SULFATE AND AMPHETAMINE SULFATE 2.5; 2.5; 2.5; 2.5 MG/1; MG/1; MG/1; MG/1
10 TABLET ORAL 2 TIMES DAILY
Qty: 60 TABLET | Refills: 0 | Status: SHIPPED | OUTPATIENT
Start: 2025-07-24

## 2025-07-30 LAB
ALBUMIN SERPL-MCNC: 4 G/DL (ref 3.5–5.2)
ALBUMIN/GLOB SERPL: 1.7 G/DL
ALP SERPL-CCNC: 61 U/L (ref 39–117)
ALT SERPL-CCNC: 14 U/L (ref 1–33)
AST SERPL-CCNC: 20 U/L (ref 1–32)
BILIRUB SERPL-MCNC: 0.3 MG/DL (ref 0–1.2)
BUN SERPL-MCNC: 16 MG/DL (ref 6–20)
BUN/CREAT SERPL: 15.2 (ref 7–25)
CALCIUM SERPL-MCNC: 9 MG/DL (ref 8.6–10.5)
CHLORIDE SERPL-SCNC: 103 MMOL/L (ref 98–107)
CHOLEST SERPL-MCNC: 160 MG/DL (ref 0–200)
CO2 SERPL-SCNC: 24.7 MMOL/L (ref 22–29)
CREAT SERPL-MCNC: 1.05 MG/DL (ref 0.57–1)
EGFRCR SERPLBLD CKD-EPI 2021: 63.7 ML/MIN/1.73
ERYTHROCYTE [DISTWIDTH] IN BLOOD BY AUTOMATED COUNT: 12.6 % (ref 12.3–15.4)
GLOBULIN SER CALC-MCNC: 2.4 GM/DL
GLUCOSE SERPL-MCNC: 93 MG/DL (ref 65–99)
HCT VFR BLD AUTO: 36.3 % (ref 34–46.6)
HDLC SERPL-MCNC: 50 MG/DL (ref 40–60)
HGB BLD-MCNC: 12.1 G/DL (ref 12–15.9)
LDLC SERPL CALC-MCNC: 88 MG/DL (ref 0–100)
MCH RBC QN AUTO: 32.5 PG (ref 26.6–33)
MCHC RBC AUTO-ENTMCNC: 33.3 G/DL (ref 31.5–35.7)
MCV RBC AUTO: 97.6 FL (ref 79–97)
PLATELET # BLD AUTO: 313 10*3/MM3 (ref 140–450)
POTASSIUM SERPL-SCNC: 4.3 MMOL/L (ref 3.5–5.2)
PROT SERPL-MCNC: 6.4 G/DL (ref 6–8.5)
RBC # BLD AUTO: 3.72 10*6/MM3 (ref 3.77–5.28)
SODIUM SERPL-SCNC: 140 MMOL/L (ref 136–145)
TRIGL SERPL-MCNC: 126 MG/DL (ref 0–150)
TSH SERPL DL<=0.005 MIU/L-ACNC: 3.18 UIU/ML (ref 0.27–4.2)
VIT B12 SERPL-MCNC: 1059 PG/ML (ref 211–946)
VLDLC SERPL CALC-MCNC: 22 MG/DL (ref 5–40)
WBC # BLD AUTO: 6.01 10*3/MM3 (ref 3.4–10.8)

## (undated) DEVICE — ENDOSCOPY PORT CONNECTOR FOR OLYMPUS® SCOPES: Brand: ERBE

## (undated) DEVICE — VLV SXN AIR/H2O ORCAPOD3 1P/U STRL

## (undated) DEVICE — Device

## (undated) DEVICE — LUBE JELLY PK/2.75GM STRL BX/144

## (undated) DEVICE — HYBRID TUBING/CAP SET FOR OLYMPUS® SCOPES: Brand: ERBE